# Patient Record
Sex: FEMALE | Race: WHITE | Employment: FULL TIME | URBAN - METROPOLITAN AREA
[De-identification: names, ages, dates, MRNs, and addresses within clinical notes are randomized per-mention and may not be internally consistent; named-entity substitution may affect disease eponyms.]

---

## 2017-03-10 ENCOUNTER — OFFICE VISIT (OUTPATIENT)
Dept: FAMILY MEDICINE CLINIC | Age: 42
End: 2017-03-10

## 2017-03-10 VITALS
TEMPERATURE: 97.8 F | HEART RATE: 75 BPM | WEIGHT: 219 LBS | BODY MASS INDEX: 29.66 KG/M2 | HEIGHT: 72 IN | SYSTOLIC BLOOD PRESSURE: 116 MMHG | DIASTOLIC BLOOD PRESSURE: 72 MMHG | OXYGEN SATURATION: 98 %

## 2017-03-10 DIAGNOSIS — L65.9 HAIR LOSS: Primary | ICD-10-CM

## 2017-03-10 DIAGNOSIS — F51.01 PRIMARY INSOMNIA: ICD-10-CM

## 2017-03-10 DIAGNOSIS — Z00.00 PREVENTATIVE HEALTH CARE: ICD-10-CM

## 2017-03-10 PROCEDURE — 99214 OFFICE O/P EST MOD 30 MIN: CPT | Performed by: FAMILY MEDICINE

## 2017-03-10 RX ORDER — TRAZODONE HYDROCHLORIDE 50 MG/1
50 TABLET ORAL NIGHTLY
Qty: 30 TABLET | Refills: 5 | Status: SHIPPED | OUTPATIENT
Start: 2017-03-10 | End: 2017-08-29 | Stop reason: SDUPTHER

## 2017-03-10 ASSESSMENT — ENCOUNTER SYMPTOMS: CONSTIPATION: 0

## 2017-03-13 ENCOUNTER — NURSE ONLY (OUTPATIENT)
Dept: FAMILY MEDICINE CLINIC | Age: 42
End: 2017-03-13

## 2017-03-13 DIAGNOSIS — Z00.00 PREVENTATIVE HEALTH CARE: ICD-10-CM

## 2017-03-13 DIAGNOSIS — Z00.00 PREVENTATIVE HEALTH CARE: Primary | ICD-10-CM

## 2017-03-13 DIAGNOSIS — L65.9 HAIR LOSS: ICD-10-CM

## 2017-03-13 PROCEDURE — 36415 COLL VENOUS BLD VENIPUNCTURE: CPT | Performed by: FAMILY MEDICINE

## 2017-03-14 DIAGNOSIS — R89.9 ABNORMAL LABORATORY TEST: Primary | ICD-10-CM

## 2017-03-14 LAB
ANION GAP SERPL CALCULATED.3IONS-SCNC: 16 MMOL/L (ref 3–16)
BUN BLDV-MCNC: 16 MG/DL (ref 7–20)
CALCIUM SERPL-MCNC: 9.2 MG/DL (ref 8.3–10.6)
CHLORIDE BLD-SCNC: 99 MMOL/L (ref 99–110)
CHOLESTEROL, TOTAL: 164 MG/DL (ref 0–199)
CO2: 25 MMOL/L (ref 21–32)
CREAT SERPL-MCNC: 0.9 MG/DL (ref 0.6–1.1)
GFR AFRICAN AMERICAN: >60
GFR NON-AFRICAN AMERICAN: >60
GLUCOSE BLD-MCNC: 86 MG/DL (ref 70–99)
HDLC SERPL-MCNC: 55 MG/DL (ref 40–60)
LDL CHOLESTEROL CALCULATED: 88 MG/DL
POTASSIUM SERPL-SCNC: 3.9 MMOL/L (ref 3.5–5.1)
SODIUM BLD-SCNC: 140 MMOL/L (ref 136–145)
TRIGL SERPL-MCNC: 103 MG/DL (ref 0–150)
TSH SERPL DL<=0.05 MIU/L-ACNC: 5.73 UIU/ML (ref 0.27–4.2)
VLDLC SERPL CALC-MCNC: 21 MG/DL

## 2017-03-17 ENCOUNTER — TELEPHONE (OUTPATIENT)
Dept: FAMILY MEDICINE CLINIC | Age: 42
End: 2017-03-17

## 2017-03-17 DIAGNOSIS — N91.2 AMENORRHEA: Primary | ICD-10-CM

## 2017-04-05 ENCOUNTER — NURSE ONLY (OUTPATIENT)
Dept: FAMILY MEDICINE CLINIC | Age: 42
End: 2017-04-05

## 2017-04-05 DIAGNOSIS — R89.9 ABNORMAL LABORATORY TEST: ICD-10-CM

## 2017-04-05 DIAGNOSIS — N91.2 AMENORRHEA: Primary | ICD-10-CM

## 2017-04-05 DIAGNOSIS — N91.2 AMENORRHEA: ICD-10-CM

## 2017-04-05 LAB
FOLLICLE STIMULATING HORMONE: 7.6 MIU/ML
LUTEINIZING HORMONE: 8.5 MIU/ML
T4 TOTAL: 5.9 UG/DL (ref 4.5–10.9)
TSH SERPL DL<=0.05 MIU/L-ACNC: 6.37 UIU/ML (ref 0.27–4.2)

## 2017-04-05 PROCEDURE — 36415 COLL VENOUS BLD VENIPUNCTURE: CPT | Performed by: FAMILY MEDICINE

## 2017-04-06 DIAGNOSIS — R89.9 ABNORMAL LABORATORY TEST: Primary | ICD-10-CM

## 2017-04-06 LAB — T4 FREE: 1 NG/DL (ref 0.9–1.8)

## 2017-05-05 ENCOUNTER — OFFICE VISIT (OUTPATIENT)
Dept: FAMILY MEDICINE CLINIC | Age: 42
End: 2017-05-05

## 2017-05-05 VITALS
BODY MASS INDEX: 29.8 KG/M2 | WEIGHT: 220 LBS | SYSTOLIC BLOOD PRESSURE: 102 MMHG | HEART RATE: 93 BPM | TEMPERATURE: 98.7 F | HEIGHT: 72 IN | DIASTOLIC BLOOD PRESSURE: 60 MMHG | OXYGEN SATURATION: 98 %

## 2017-05-05 DIAGNOSIS — K58.0 IRRITABLE BOWEL SYNDROME WITH DIARRHEA: ICD-10-CM

## 2017-05-05 DIAGNOSIS — Z87.898 H/O MOTION SICKNESS: ICD-10-CM

## 2017-05-05 DIAGNOSIS — F41.9 ANXIETY: Primary | ICD-10-CM

## 2017-05-05 PROCEDURE — 99213 OFFICE O/P EST LOW 20 MIN: CPT | Performed by: FAMILY MEDICINE

## 2017-05-05 RX ORDER — DICYCLOMINE HYDROCHLORIDE 10 MG/1
10 CAPSULE ORAL 4 TIMES DAILY PRN
Qty: 120 CAPSULE | Refills: 3 | Status: SHIPPED | OUTPATIENT
Start: 2017-05-05 | End: 2019-05-23

## 2017-05-05 RX ORDER — LORAZEPAM 0.5 MG/1
0.5 TABLET ORAL EVERY 8 HOURS PRN
Qty: 20 TABLET | Refills: 0 | Status: SHIPPED | OUTPATIENT
Start: 2017-05-05 | End: 2017-09-18 | Stop reason: SDUPTHER

## 2017-05-05 ASSESSMENT — PATIENT HEALTH QUESTIONNAIRE - PHQ9
2. FEELING DOWN, DEPRESSED OR HOPELESS: 0
SUM OF ALL RESPONSES TO PHQ9 QUESTIONS 1 & 2: 0
1. LITTLE INTEREST OR PLEASURE IN DOING THINGS: 0
SUM OF ALL RESPONSES TO PHQ QUESTIONS 1-9: 0

## 2017-05-05 ASSESSMENT — ENCOUNTER SYMPTOMS: DIARRHEA: 0

## 2017-08-29 DIAGNOSIS — F51.01 PRIMARY INSOMNIA: ICD-10-CM

## 2017-08-29 RX ORDER — TRAZODONE HYDROCHLORIDE 50 MG/1
TABLET ORAL
Qty: 30 TABLET | Refills: 2 | Status: SHIPPED | OUTPATIENT
Start: 2017-08-29 | End: 2017-09-28 | Stop reason: SDUPTHER

## 2017-09-18 DIAGNOSIS — F41.9 ANXIETY: ICD-10-CM

## 2017-09-18 RX ORDER — LORAZEPAM 0.5 MG/1
0.5 TABLET ORAL DAILY PRN
Qty: 15 TABLET | Refills: 0 | OUTPATIENT
Start: 2017-09-18 | End: 2018-02-26 | Stop reason: SDUPTHER

## 2017-09-28 ENCOUNTER — TELEPHONE (OUTPATIENT)
Dept: FAMILY MEDICINE CLINIC | Age: 42
End: 2017-09-28

## 2017-09-28 DIAGNOSIS — F51.01 PRIMARY INSOMNIA: ICD-10-CM

## 2017-09-28 RX ORDER — TRAZODONE HYDROCHLORIDE 50 MG/1
TABLET ORAL
Qty: 90 TABLET | Refills: 1 | Status: SHIPPED | OUTPATIENT
Start: 2017-09-28 | End: 2019-05-30 | Stop reason: ALTCHOICE

## 2017-09-28 NOTE — TELEPHONE ENCOUNTER
Spoke with pt regarding overdue blood work results.  Pt states that she can come in tomorrow at 11:45 am.

## 2017-09-29 ENCOUNTER — NURSE ONLY (OUTPATIENT)
Dept: FAMILY MEDICINE CLINIC | Age: 42
End: 2017-09-29

## 2017-09-29 DIAGNOSIS — R89.9 ABNORMAL LABORATORY TEST: Primary | ICD-10-CM

## 2017-09-29 DIAGNOSIS — R89.9 ABNORMAL LABORATORY TEST: ICD-10-CM

## 2017-09-29 PROCEDURE — 36415 COLL VENOUS BLD VENIPUNCTURE: CPT | Performed by: FAMILY MEDICINE

## 2017-09-30 LAB
T4 FREE: 0.9 NG/DL (ref 0.9–1.8)
TSH SERPL DL<=0.05 MIU/L-ACNC: 3.88 UIU/ML (ref 0.27–4.2)

## 2018-02-26 DIAGNOSIS — F41.9 ANXIETY: ICD-10-CM

## 2018-02-27 RX ORDER — LORAZEPAM 0.5 MG/1
0.5 TABLET ORAL DAILY PRN
Qty: 15 TABLET | Refills: 0 | Status: SHIPPED | OUTPATIENT
Start: 2018-02-27 | End: 2018-03-29

## 2018-03-01 ENCOUNTER — NURSE ONLY (OUTPATIENT)
Dept: FAMILY MEDICINE CLINIC | Age: 43
End: 2018-03-01

## 2018-03-01 DIAGNOSIS — Z79.899 MEDICATION MANAGEMENT: Primary | ICD-10-CM

## 2019-05-23 ENCOUNTER — HOSPITAL ENCOUNTER (EMERGENCY)
Age: 44
Discharge: HOME | End: 2019-05-23
Attending: EMERGENCY MEDICINE
Payer: COMMERCIAL

## 2019-05-23 VITALS
HEART RATE: 99 BPM | TEMPERATURE: 98.2 F | BODY MASS INDEX: 31.15 KG/M2 | OXYGEN SATURATION: 98 % | RESPIRATION RATE: 18 BRPM | DIASTOLIC BLOOD PRESSURE: 69 MMHG | WEIGHT: 230 LBS | HEIGHT: 72 IN | SYSTOLIC BLOOD PRESSURE: 114 MMHG

## 2019-05-23 DIAGNOSIS — D72.829 LEUKOCYTOSIS, UNSPECIFIED TYPE: ICD-10-CM

## 2019-05-23 DIAGNOSIS — R10.9 ABDOMINAL CRAMPING: ICD-10-CM

## 2019-05-23 DIAGNOSIS — R19.7 DIARRHEA, UNSPECIFIED TYPE: Primary | ICD-10-CM

## 2019-05-23 DIAGNOSIS — E86.0 DEHYDRATION: ICD-10-CM

## 2019-05-23 LAB
A/G RATIO: 1.3 (ref 1.1–2.2)
ALBUMIN SERPL-MCNC: 4.3 G/DL (ref 3.4–5)
ALP BLD-CCNC: 150 U/L (ref 40–129)
ALT SERPL-CCNC: 10 U/L (ref 10–40)
ANION GAP SERPL CALCULATED.3IONS-SCNC: 14 MMOL/L (ref 3–16)
AST SERPL-CCNC: 14 U/L (ref 15–37)
BACTERIA: ABNORMAL /HPF
BASOPHILS ABSOLUTE: 0.1 K/UL (ref 0–0.2)
BASOPHILS RELATIVE PERCENT: 0.5 %
BILIRUB SERPL-MCNC: <0.2 MG/DL (ref 0–1)
BILIRUBIN URINE: ABNORMAL
BLOOD, URINE: ABNORMAL
BUN BLDV-MCNC: 13 MG/DL (ref 7–20)
CALCIUM SERPL-MCNC: 9.7 MG/DL (ref 8.3–10.6)
CHLORIDE BLD-SCNC: 101 MMOL/L (ref 99–110)
CLARITY: CLEAR
CO2: 23 MMOL/L (ref 21–32)
COLOR: YELLOW
CREAT SERPL-MCNC: 0.9 MG/DL (ref 0.6–1.1)
EOSINOPHILS ABSOLUTE: 1.5 K/UL (ref 0–0.6)
EOSINOPHILS RELATIVE PERCENT: 12.9 %
EPITHELIAL CELLS, UA: ABNORMAL /HPF
GFR AFRICAN AMERICAN: >60
GFR NON-AFRICAN AMERICAN: >60
GLOBULIN: 3.3 G/DL
GLUCOSE BLD-MCNC: 115 MG/DL (ref 70–99)
GLUCOSE URINE: NEGATIVE MG/DL
HCG QUALITATIVE: NEGATIVE
HCT VFR BLD CALC: 43.9 % (ref 36–48)
HEMOGLOBIN: 15 G/DL (ref 12–16)
KETONES, URINE: >=80 MG/DL
LEUKOCYTE ESTERASE, URINE: NEGATIVE
LIPASE: 20 U/L (ref 13–60)
LYMPHOCYTES ABSOLUTE: 1.6 K/UL (ref 1–5.1)
LYMPHOCYTES RELATIVE PERCENT: 13.2 %
MCH RBC QN AUTO: 31.1 PG (ref 26–34)
MCHC RBC AUTO-ENTMCNC: 34.1 G/DL (ref 31–36)
MCV RBC AUTO: 91.2 FL (ref 80–100)
MICROSCOPIC EXAMINATION: YES
MONOCYTES ABSOLUTE: 0.7 K/UL (ref 0–1.3)
MONOCYTES RELATIVE PERCENT: 5.9 %
NEUTROPHILS ABSOLUTE: 8.1 K/UL (ref 1.7–7.7)
NEUTROPHILS RELATIVE PERCENT: 67.5 %
NITRITE, URINE: NEGATIVE
PDW BLD-RTO: 13.2 % (ref 12.4–15.4)
PH UA: 6 (ref 5–8)
PLATELET # BLD: 295 K/UL (ref 135–450)
PMV BLD AUTO: 7 FL (ref 5–10.5)
POTASSIUM SERPL-SCNC: 3.8 MMOL/L (ref 3.5–5.1)
PROTEIN UA: NEGATIVE MG/DL
RBC # BLD: 4.82 M/UL (ref 4–5.2)
RBC UA: ABNORMAL /HPF (ref 0–2)
SODIUM BLD-SCNC: 138 MMOL/L (ref 136–145)
SPECIFIC GRAVITY UA: 1.01 (ref 1–1.03)
TOTAL PROTEIN: 7.6 G/DL (ref 6.4–8.2)
URINE TYPE: ABNORMAL
UROBILINOGEN, URINE: 0.2 E.U./DL
WBC # BLD: 12 K/UL (ref 4–11)
WBC UA: ABNORMAL /HPF (ref 0–5)
YEAST: PRESENT /HPF

## 2019-05-23 PROCEDURE — 80053 COMPREHEN METABOLIC PANEL: CPT

## 2019-05-23 PROCEDURE — 2580000003 HC RX 258: Performed by: EMERGENCY MEDICINE

## 2019-05-23 PROCEDURE — 96361 HYDRATE IV INFUSION ADD-ON: CPT

## 2019-05-23 PROCEDURE — 81001 URINALYSIS AUTO W/SCOPE: CPT

## 2019-05-23 PROCEDURE — 99284 EMERGENCY DEPT VISIT MOD MDM: CPT

## 2019-05-23 PROCEDURE — 6360000002 HC RX W HCPCS: Performed by: EMERGENCY MEDICINE

## 2019-05-23 PROCEDURE — 84703 CHORIONIC GONADOTROPIN ASSAY: CPT

## 2019-05-23 PROCEDURE — 96374 THER/PROPH/DIAG INJ IV PUSH: CPT

## 2019-05-23 PROCEDURE — 85025 COMPLETE CBC W/AUTO DIFF WBC: CPT

## 2019-05-23 PROCEDURE — 83690 ASSAY OF LIPASE: CPT

## 2019-05-23 RX ORDER — ONDANSETRON 2 MG/ML
4 INJECTION INTRAMUSCULAR; INTRAVENOUS ONCE
Status: COMPLETED | OUTPATIENT
Start: 2019-05-23 | End: 2019-05-23

## 2019-05-23 RX ORDER — 0.9 % SODIUM CHLORIDE 0.9 %
1000 INTRAVENOUS SOLUTION INTRAVENOUS ONCE
Status: COMPLETED | OUTPATIENT
Start: 2019-05-23 | End: 2019-05-23

## 2019-05-23 RX ORDER — ONDANSETRON 4 MG/1
4 TABLET, ORALLY DISINTEGRATING ORAL EVERY 8 HOURS PRN
Qty: 20 TABLET | Refills: 0 | Status: SHIPPED | OUTPATIENT
Start: 2019-05-23 | End: 2021-05-05

## 2019-05-23 RX ORDER — DICYCLOMINE HYDROCHLORIDE 10 MG/1
10 CAPSULE ORAL EVERY 6 HOURS PRN
Qty: 20 CAPSULE | Refills: 0 | Status: SHIPPED | OUTPATIENT
Start: 2019-05-23 | End: 2021-05-05

## 2019-05-23 RX ADMIN — ONDANSETRON 4 MG: 2 INJECTION INTRAMUSCULAR; INTRAVENOUS at 18:25

## 2019-05-23 RX ADMIN — SODIUM CHLORIDE 1000 ML: 9 INJECTION, SOLUTION INTRAVENOUS at 18:25

## 2019-05-23 ASSESSMENT — ENCOUNTER SYMPTOMS
SHORTNESS OF BREATH: 0
COLOR CHANGE: 0
ABDOMINAL DISTENTION: 0
ANAL BLEEDING: 0
RECTAL PAIN: 1
ABDOMINAL PAIN: 1
VOMITING: 0
BLOOD IN STOOL: 0
NAUSEA: 0
CHEST TIGHTNESS: 0
DIARRHEA: 1
CONSTIPATION: 0
BACK PAIN: 0

## 2019-05-23 NOTE — ED NOTES
Per lab, specimen cup containing urine came open during transfer to lab and sample was rejected. Will inform MD and re-collect sample.      Loretha Riedel, RN  05/23/19 0827

## 2019-05-23 NOTE — ED PROVIDER NOTES
[address] [facility]  ED  EMERGENCY DEPARTMENT ENCOUNTER        Pt Name: Victoriano Miguel  MRN: [id number]  [place] 1975  Date of evaluation: 5/23/2019  Provider: Clotilde Estevez MD  PCP: No primary care provider on file. CHIEF COMPLAINT       Chief Complaint   Patient presents with    Diarrhea     diarrhea started last weekend. states it got better but says since Tuesday she's had continued diarrhea. pt able to intake fluids and food ok, however c./o continued diarrhea        HISTORY OFPRESENT ILLNESS   (Location/Symptom, Timing/Onset, Context/Setting, Quality, Duration, Modifying Factors,Severity)  Note limiting factors. Victoriano Miguel is a 37 y.o. female presenting today due to concern for having diarrhea for the last 5 days but initially started to improve until 3 days ago whenever it started becoming more frequent again. However, today during one of the diarrhea episode she felt lightheaded and had sweating associated with it. She denies any headache, chest pain, shortness of breath, fever. She has not been on any antibiotics recently and no recent travel. She did have a prior cholecystectomy and has chronic diarrhea but states this is much worse. She is nauseated but no vomiting. No urinary or vaginal complaints. Due to concern for worsening diarrhea, she came to the emergency department for further evaluation. REVIEW OF SYSTEMS    (2-9 systems for level 4, 10 or more for level 5)     Review of Systems   Constitutional: Positive for diaphoresis (during diarrhea episode earlier today, not currently). Negative for chills, fatigue and fever. HENT: Negative for congestion. Respiratory: Negative for chest tightness and shortness of breath. Cardiovascular: Negative for chest pain. Gastrointestinal: Positive for abdominal pain (not currently, but has discomfort intermittently), diarrhea and rectal pain (only with diarrhea, none currently).  Negative for abdominal Used   Substance and Sexual Activity    Alcohol use: No    Drug use: No    Sexual activity: Yes     Partners: Male     Comment: hai = Radha Topete   Lifestyle    Physical activity:     Days per week: None     Minutes per session: None    Stress: None   Relationships    Social connections:     Talks on phone: None     Gets together: None     Attends [employer] service: None     Active member of club or organization: None     Attends meetings of clubs or organizations: None     Relationship status: None    Intimate partner violence:     Fear of current or ex partner: None     Emotionally abused: None     Physically abused: None     Forced sexual activity: None   Other Topics Concern    None   Social History Narrative    None       SCREENINGS    Colin Coma Scale  Eye Opening: Spontaneous  Best Verbal Response: Oriented  Best Motor Response: Obeys commands  Colin Coma Scale Score: 15           PHYSICAL EXAM    (up to 7 for level 4, 8 or more for level 5)     ED Triage Vitals [05/23/19 1727]   BP Temp Temp Source Pulse Resp SpO2 Height Weight   (!) 143/80 98.2 °F (36.8 °C) Oral 113 16 95 % 6' (1.829 m) 230 lb (104.3 kg)       Physical Exam   Constitutional: She is oriented to person, place, and time. She appears well-developed and well-nourished. She is active and cooperative. Non-toxic appearance. She does not have a sickly appearance. She does not appear ill. No distress. HENT:   Head: Normocephalic and atraumatic. Nose: Nose normal.   Mouth/Throat: Uvula is midline. Mucous membranes are dry. No trismus in the jaw. No oropharyngeal exudate. Eyes: Pupils are equal, round, and reactive to light. EOM are normal. Right eye exhibits no discharge. Left eye exhibits no discharge. Neck: Normal range of motion and full passive range of motion without pain. Neck supple. No neck rigidity. No tracheal deviation, no edema, no erythema and normal range of motion present.    Cardiovascular: Regular rhythm and intact distal pulses. Tachycardia present. Pulmonary/Chest: Effort normal and breath sounds normal. No accessory muscle usage or stridor. No respiratory distress. She has no decreased breath sounds. She has no wheezes. She has no rhonchi. She has no rales. She exhibits no tenderness. Abdominal: Soft. Bowel sounds are normal. She exhibits no distension. There is no tenderness. There is no rigidity, no rebound, no guarding, no tenderness at McBurney's point and negative Cheatham's sign. Very benign abdominal exam with no tenderness     Musculoskeletal: Normal range of motion. She exhibits no edema, tenderness or deformity. Neurological: She is alert and oriented to person, place, and time. She has normal strength. She displays no atrophy and no tremor. No sensory deficit. She exhibits normal muscle tone. She displays no seizure activity. GCS eye subscore is 4. GCS verbal subscore is 5. GCS motor subscore is 6. Skin: Skin is warm, dry and intact. No rash noted. She is not diaphoretic. No erythema. No pallor. Psychiatric: She has a normal mood and affect. Nursing note and vitals reviewed.           DIAGNOSTIC RESULTS   :    Labs Reviewed   CBC WITH AUTO DIFFERENTIAL - Abnormal; Notable for the following components:       Result Value    WBC 12.0 (*)     Neutrophils # 8.1 (*)     Eosinophils # 1.5 (*)     All other components within normal limits    Narrative:     Performed at:  [address],  [place], [address]   Phone [phone]   COMPREHENSIVE METABOLIC PANEL - Abnormal; Notable for the following components:    Glucose 115 (*)     Alkaline Phosphatase 150 (*)     AST 14 (*)     All other components within normal limits    Narrative:     Performed at:  [facility]) - [facility]  [address],  [place], [address]   Phone [phone]   URINALYSIS - Abnormal; Notable for the following components:    Bilirubin Urine SMALL (*) Ketones, Urine >=80 (*)     Blood, Urine TRACE-INTACT (*)     All other components within normal limits    Narrative:     CALL  [facility]  [facility] tel. [phone],  Urinalysis results called to and read back by Eulalio RYAN, 05/23/2019  19:41, by Loc Spencer  Performed at:  [address], [facility] [place]   Phone [phone]   MICROSCOPIC URINALYSIS - Abnormal; Notable for the following components:    Bacteria, UA Rare (*)     Yeast, UA Present (*)     All other components within normal limits    Narrative:     CALL  [facility]  [facility] tel. [phone],  Urinalysis results called to and read back by Eulalio RYAN, 05/23/2019  19:41, by Loc Spencer  Performed at:  [address], [facility] [place]   Phone [phone]   LIPASE    Narrative:     Performed at:  [facility]) [address], [facility] [place]   Phone [phone]   HCG, SERUM, QUALITATIVE    Narrative:     Performed at:  [address], [facility] [place]   Phone [phone]       All other labs were within normal range or not returned asof this dictation. EKG: All EKG's are interpreted by the Emergency Department Physician who either signs or Co-signs this chart in the absence of a cardiologist.        RADIOLOGY:   Non-plain film images such as CT, Ultrasound and MRI are read by the radiologist. Romi Sauers images are visualized and preliminarily interpreted by the  ED Provider with the belowfindings:        Interpretation per the Radiologist below, if available at the time of this note:    No orders to display         PROCEDURES   Unless otherwise noted below, none     Procedures    CRITICAL CARE TIME   Time: 20 minutes   Includes repeat examinations,lab  interpretation, charting, IV fluids for tachycardia/dehydration    Excludes separate billable procedures.   Patient at for discharge without needing further IV fluids and based on subjective testing, she would be negative for orthostatics. She denied ever having any chest pain or shortness of breath and story not suggestive of acute coronary syndrome or pulmonary embolism. She understands to return to the emergency department if she were to develop either chest pain or shortness of breath. Otherwise, she was told to follow up with her primary doctor in 2-3 days, but return sooner to the emergency department if symptoms worsen,. She was well-appearing and in no acute distress at time of discharge and felt comfortable with this plan. No urinary complaints and therefore she was not treated for yeast at this time. The patient tolerated their visit well. The patient and / or the family were informed of the results of any tests, a time was given to answer questions. FINAL IMPRESSION      1. Diarrhea, unspecified type    2. Dehydration    3. Abdominal cramping    4.  Leukocytosis, unspecified type          DISPOSITION/PLAN   DISPOSITION  - discharged in improved, stable condition       PATIENT REFERRED TO:  [facility]  ED  [address] [facility] [address]  Go to   If symptoms worsen    [facility]  [phone]  Schedule an appointment as soon as possible for a visit in 3 days        DISCHARGEMEDICATIONS:  Discharge Medication List as of 5/23/2019  8:53 PM      START taking these medications    Details   ondansetron (ZOFRAN ODT) 4 MG disintegrating tablet Take 1 tablet by mouth every 8 hours as needed for Nausea, Disp-20 tablet, R-0Print             DISCONTINUED MEDICATIONS:  Discharge Medication List as of 5/23/2019  8:53 PM      STOP taking these medications       triamterene-hydrochlorothiazide (DYAZIDE) 37.5-25 MG per capsule Comments:   Reason for Stopping:         levonorgestrel (MIRENA) 20 MCG/24HR IUD Comments:   Reason for Stopping:                      (Please note

## 2019-05-24 ENCOUNTER — HOSPITAL ENCOUNTER (OUTPATIENT)
Age: 44
Setting detail: SPECIMEN
Discharge: HOME | End: 2019-05-24
Payer: COMMERCIAL

## 2019-05-24 LAB — C DIFFICILE TOXIN, EIA: NORMAL

## 2019-05-24 PROCEDURE — 87336 ENTAMOEB HIST DISPR AG IA: CPT

## 2019-05-24 PROCEDURE — 87324 CLOSTRIDIUM AG IA: CPT

## 2019-05-24 PROCEDURE — 87046 STOOL CULTR AEROBIC BACT EA: CPT

## 2019-05-24 PROCEDURE — 87328 CRYPTOSPORIDIUM AG IA: CPT

## 2019-05-24 PROCEDURE — 87449 NOS EACH ORGANISM AG IA: CPT

## 2019-05-24 PROCEDURE — 87505 NFCT AGENT DETECTION GI: CPT

## 2019-05-25 LAB
CRYPTOSPORIDIUM ANTIGEN STOOL: NORMAL
E HISTOLYTICA ANTIGEN STOOL: NORMAL
GI BACTERIAL PATHOGENS BY PCR: NORMAL
GIARDIA ANTIGEN STOOL: NORMAL

## 2019-05-30 ENCOUNTER — OFFICE VISIT (OUTPATIENT)
Dept: FAMILY MEDICINE CLINIC | Age: 44
End: 2019-05-30
Payer: COMMERCIAL

## 2019-05-30 VITALS
HEART RATE: 89 BPM | HEIGHT: 72 IN | DIASTOLIC BLOOD PRESSURE: 74 MMHG | BODY MASS INDEX: 30.61 KG/M2 | WEIGHT: 226 LBS | SYSTOLIC BLOOD PRESSURE: 130 MMHG | OXYGEN SATURATION: 99 %

## 2019-05-30 DIAGNOSIS — R73.9 BLOOD GLUCOSE ELEVATED: ICD-10-CM

## 2019-05-30 DIAGNOSIS — Z90.49 STATUS POST CHOLECYSTECTOMY: ICD-10-CM

## 2019-05-30 DIAGNOSIS — K58.0 IRRITABLE BOWEL SYNDROME WITH DIARRHEA: Primary | ICD-10-CM

## 2019-05-30 DIAGNOSIS — R31.1 BENIGN ESSENTIAL MICROSCOPIC HEMATURIA: ICD-10-CM

## 2019-05-30 DIAGNOSIS — E66.09 CLASS 1 OBESITY DUE TO EXCESS CALORIES WITHOUT SERIOUS COMORBIDITY WITH BODY MASS INDEX (BMI) OF 30.0 TO 30.9 IN ADULT: ICD-10-CM

## 2019-05-30 PROCEDURE — 99214 OFFICE O/P EST MOD 30 MIN: CPT | Performed by: FAMILY MEDICINE

## 2019-05-30 RX ORDER — CHOLESTYRAMINE 4 G/9G
1 POWDER, FOR SUSPENSION ORAL DAILY
Qty: 90 PACKET | Refills: 3 | Status: SHIPPED | OUTPATIENT
Start: 2019-05-30 | End: 2021-05-05

## 2019-05-30 ASSESSMENT — PATIENT HEALTH QUESTIONNAIRE - PHQ9
SUM OF ALL RESPONSES TO PHQ QUESTIONS 1-9: 0
1. LITTLE INTEREST OR PLEASURE IN DOING THINGS: 0
SUM OF ALL RESPONSES TO PHQ9 QUESTIONS 1 & 2: 0
SUM OF ALL RESPONSES TO PHQ QUESTIONS 1-9: 0
2. FEELING DOWN, DEPRESSED OR HOPELESS: 0

## 2019-05-30 NOTE — PROGRESS NOTES
2019     Rosa Oswald (:  1975)is a 37 y.o. female, here for evaluation of the following medical concerns:    CC: diarrhea     HPI     diarrhea   Seen in ED about a week ago, had normal stool studies  Has been bad for 2 weeks, has prior diagnosis of IBS, has had gallbladder removed    No family hx of ulcerative colitis  Not on any medications  Not recently on any abx    Gall bladder has been taken out, had IBSD after this    Anxiety  Controlled, taking zoloft as prescribed    Obesity  BMI of 30, on low carb diet    hematuria  Had trace blood in UA in ED    Elevated blood glucose  Also had BS of 115    Review of Systems  GI: + diarrhea, somewhat chronic, worse now  No blood in stool  No nighttime pain  Psych: no anxiety    Prior to Visit Medications    Medication Sig Taking? Authorizing Provider   Loperamide HCl (IMODIUM PO) Take by mouth Yes Historical Provider, MD   cholestyramine (QUESTRAN) 4 g packet Take 1 packet by mouth daily Yes Ирина Page MD   dicyclomine (BENTYL) 10 MG capsule Take 1 capsule by mouth every 6 hours as needed (cramps) Yes Rosales Jimenez MD   sertraline (ZOLOFT) 100 MG tablet Take 50 mg by mouth daily  Yes Historical Provider, MD   ondansetron (ZOFRAN ODT) 4 MG disintegrating tablet Take 1 tablet by mouth every 8 hours as needed for Nausea  Rosales Jimenez MD        Social History     Tobacco Use    Smoking status: Never Smoker    Smokeless tobacco: Never Used   Substance Use Topics    Alcohol use: No        Vitals:    19 1416   BP: 130/74   Site: Right Upper Arm   Position: Sitting   Cuff Size: Large Adult   Pulse: 89   SpO2: 99%  Comment: RA   Weight: 226 lb (102.5 kg)   Height: 6' (1.829 m)     Estimated body mass index is 30.65 kg/m² as calculated from the following:    Height as of this encounter: 6' (1.829 m). Weight as of this encounter: 226 lb (102.5 kg). Physical Exam  Constitutional: appears well-developed and well-nourished. No distress.

## 2019-05-30 NOTE — PATIENT INSTRUCTIONS
Patient Education        Diet for Irritable Bowel Syndrome: Care Instructions  Your Care Instructions    Irritable bowel syndrome, or IBS, is a problem with the intestines. IBS can cause belly pain, bloating, gas, constipation, and diarrhea. Most people can control their symptoms by changing their diet and easing stress. No specific foods cause everyone with IBS to have symptoms. Many people find that they feel better by limiting or eliminating foods that may bring on symptoms. Make sure you don't stop eating all foods from any one food group without talking with a dietitian. You need to make sure you are still getting all the nutrients you need. Follow-up care is a key part of your treatment and safety. Be sure to make and go to all appointments, and call your doctor if you are having problems. It's also a good idea to know your test results and keep a list of the medicines you take. How can you care for yourself at home? To reduce constipation  · Include fruits, vegetables, beans, and whole grains in your diet each day. These foods are high in fiber. Slowly increase the amount of fiber you eat. This helps you avoid a lot of gas. · Drink plenty of fluids. If you have kidney, heart, or liver disease and have to limit fluids, talk with your doctor before you increase the amount of fluids you drink. · Get some exercise every day. Build up slowly to 30 to 60 minutes a day on 5 or more days of the week. · Take a fiber supplement, such as Citrucel or Metamucil, every day if needed. Read and follow all instructions on the label. · Schedule time each day for a bowel movement. Having a daily routine may help. Take your time and do not strain when having a bowel movement. · Check with your doctor before you increase the amount of fiber in your diet. For some people who have IBS, eating more fiber may make some symptoms worse. This includes bloating.   To reduce diarrhea  You may try giving up foods or drinks one at a time to see whether symptoms improve. Limit or avoid the following:  · Alcohol  · Caffeine, which is found in coffee, tea, cola drinks, and chocolate  · Nicotine, from smoking or chewing tobacco  · Gas-producing foods, such as beans, broccoli, cabbage, and apples  · Dairy products that contain lactose (milk sugar), such as ice cream and milk. · Foods and drinks high in sugar, especially fruit juice, soda, candy, and other packaged sweets (such as cookies)  · Foods high in fat, including phelps, sausage, butter, oils, and anything deep-fried  · Sorbitol and xylitol, artificial sweeteners found in some sugarless candies and chewing gum  Keep track of foods  · Some people with IBS use a daily food diary to keep track of what they eat and whether they have any symptoms after eating certain foods. The diary also can be a good way to record what is going on in your life. · Stress plays a role in IBS. So if you are aware that certain stresses bring on symptoms, you can try to reduce those stresses. Keep mealtimes pleasant  · Try to maintain a pleasant environment when you eat. This may reduce stress that can make symptoms likely to occur. · Give yourself plenty of time to eat, rather than eating on the go. Chew your food slowly. Try not to swallow air, which can cause bloating. Where can you learn more? Go to https://GAMINSIDE.Weby[employer].. org and sign in to your [employer] account. Enter P525 in the [facility] box to learn more about \"Diet for Irritable Bowel Syndrome: Care Instructions. \"     If you do not have an account, please click on the \"Sign Up Now\" link. Current as of: November 7, 2018  Content Version: 12.0  © 1434-7983 [employer]wise, [employer]. Care instructions adapted under license by [facility] ([facility]).  If you have questions about a medical condition or this instruction, always ask your healthcare professional. Wilma Lynn any warranty or liability for your use of

## 2019-05-31 LAB
C-REACTIVE PROTEIN: 2.3 MG/L (ref 0–5.1)
ESTIMATED AVERAGE GLUCOSE: 111.2 MG/DL
HBA1C MFR BLD: 5.5 %

## 2019-06-01 LAB — TISSUE TRANSGLUTAMINASE IGA: 3 U/ML (ref 0–3)

## 2020-03-12 LAB
ALBUMIN SERPL-MCNC: 4.4 G/DL
ALP BLD-CCNC: 109 U/L
ALT SERPL-CCNC: 14 U/L
ANION GAP SERPL CALCULATED.3IONS-SCNC: NORMAL MMOL/L
AST SERPL-CCNC: 18 U/L
BASOPHILS ABSOLUTE: 0 /ΜL
BASOPHILS RELATIVE PERCENT: 1 %
BILIRUB SERPL-MCNC: 0.3 MG/DL (ref 0.1–1.4)
BUN BLDV-MCNC: 23 MG/DL
CALCIUM SERPL-MCNC: 9.3 MG/DL
CHLORIDE BLD-SCNC: 106 MMOL/L
CHOLESTEROL, TOTAL: 147 MG/DL
CHOLESTEROL/HDL RATIO: NORMAL
CO2: 26 MMOL/L
CREAT SERPL-MCNC: 0.8 MG/DL
EOSINOPHILS ABSOLUTE: 0.1 /ΜL
EOSINOPHILS RELATIVE PERCENT: 2.4 %
GFR CALCULATED: 89
GLUCOSE BLD-MCNC: 99 MG/DL
HCT VFR BLD CALC: 42.5 % (ref 36–46)
HDLC SERPL-MCNC: 57 MG/DL (ref 35–70)
HEMOGLOBIN: 14.8 G/DL (ref 12–16)
LDL CHOLESTEROL CALCULATED: 82 MG/DL (ref 0–160)
LYMPHOCYTES ABSOLUTE: 1.4 /ΜL
LYMPHOCYTES RELATIVE PERCENT: 35.9 %
MCH RBC QN AUTO: 31.9 PG
MCHC RBC AUTO-ENTMCNC: 34.9 G/DL
MCV RBC AUTO: 91.4 FL
MONOCYTES ABSOLUTE: 0.4 /ΜL
MONOCYTES RELATIVE PERCENT: 9.7 %
NEUTROPHILS ABSOLUTE: 2 /ΜL
NEUTROPHILS RELATIVE PERCENT: 51 %
NONHDLC SERPL-MCNC: NORMAL MG/DL
PDW BLD-RTO: 13.6 %
PLATELET # BLD: 297 K/ΜL
PMV BLD AUTO: NORMAL FL
POTASSIUM SERPL-SCNC: 4.9 MMOL/L
RBC # BLD: 4.65 10^6/ΜL
SODIUM BLD-SCNC: 145 MMOL/L
TOTAL PROTEIN: 7.2
TRIGL SERPL-MCNC: 40 MG/DL
VLDLC SERPL CALC-MCNC: 8 MG/DL
WBC # BLD: 4 10^3/ML

## 2021-05-02 ENCOUNTER — HOSPITAL ENCOUNTER (EMERGENCY)
Age: 46
Discharge: HOME | End: 2021-05-02
Payer: COMMERCIAL

## 2021-05-02 ENCOUNTER — APPOINTMENT (OUTPATIENT)
Dept: GENERAL RADIOLOGY | Age: 46
End: 2021-05-02
Payer: COMMERCIAL

## 2021-05-02 VITALS
BODY MASS INDEX: 28.44 KG/M2 | SYSTOLIC BLOOD PRESSURE: 141 MMHG | WEIGHT: 210 LBS | RESPIRATION RATE: 16 BRPM | TEMPERATURE: 98 F | HEIGHT: 72 IN | OXYGEN SATURATION: 96 % | HEART RATE: 91 BPM | DIASTOLIC BLOOD PRESSURE: 78 MMHG

## 2021-05-02 DIAGNOSIS — S92.225A CLOSED NONDISPLACED FRACTURE OF LATERAL CUNEIFORM OF LEFT FOOT, INITIAL ENCOUNTER: ICD-10-CM

## 2021-05-02 DIAGNOSIS — S92.909A FOOT FRACTURE, UNSPECIFIED LATERALITY, CLOSED, INITIAL ENCOUNTER: Primary | ICD-10-CM

## 2021-05-02 DIAGNOSIS — T14.8XXA ABRASION: ICD-10-CM
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
## 2021-05-02 DIAGNOSIS — S92.902A CLOSED FRACTURE OF LEFT FOOT, INITIAL ENCOUNTER: ICD-10-CM

## 2021-05-02 PROCEDURE — 73610 X-RAY EXAM OF ANKLE: CPT

## 2021-05-02 PROCEDURE — 73630 X-RAY EXAM OF FOOT: CPT

## 2021-05-02 PROCEDURE — 6370000000 HC RX 637 (ALT 250 FOR IP): Performed by: NURSE PRACTITIONER

## 2021-05-02 PROCEDURE — 99284 EMERGENCY DEPT VISIT MOD MDM: CPT

## 2021-05-02 RX ORDER — NAPROXEN 500 MG/1
500 TABLET ORAL 2 TIMES DAILY
Qty: 20 TABLET | Refills: 0 | Status: SHIPPED | OUTPATIENT
Start: 2021-05-02 | End: 2022-05-02

## 2021-05-02 RX ORDER — OXYCODONE HYDROCHLORIDE AND ACETAMINOPHEN 5; 325 MG/1; MG/1
1 TABLET ORAL EVERY 6 HOURS PRN
Qty: 10 TABLET | Refills: 0 | Status: SHIPPED | OUTPATIENT
Start: 2021-05-02 | End: 2021-05-05

## 2021-05-02 RX ORDER — OXYCODONE HYDROCHLORIDE AND ACETAMINOPHEN 5; 325 MG/1; MG/1
1 TABLET ORAL ONCE
Status: COMPLETED | OUTPATIENT
Start: 2021-05-02 | End: 2021-05-02

## 2021-05-02 RX ADMIN — OXYCODONE AND ACETAMINOPHEN 1 TABLET: 5; 325 TABLET ORAL at 17:28

## 2021-05-02 ASSESSMENT — PAIN DESCRIPTION - ORIENTATION: ORIENTATION: LEFT

## 2021-05-02 ASSESSMENT — ENCOUNTER SYMPTOMS
ABDOMINAL PAIN: 0
SHORTNESS OF BREATH: 0
SORE THROAT: 0
COLOR CHANGE: 0
RHINORRHEA: 0

## 2021-05-02 NOTE — ED PROVIDER NOTES
Evaluated by [id number] [facility] Provider          [facility]  EMERGENCY DEPARTMENT ENCOUNTER        Pt Name: Poonam Pires  MRN: [id number]  [place] 1975  Dateof evaluation: 5/2/2021  Provider: EMIGDIO Dallas CNP  PCP: No primary care provider on file. ED Attending: No att. providers found    [facility]       Chief Complaint   Patient presents with    Ankle Pain     left ankle pain/bruising/swelling. slipped on stairs about an hour ago and injured left ankle, unable to put any pressure on foot       HISTORY OF PRESENTILLNESS   (Location/Symptom, Timing/Onset, Context/Setting, Quality, Duration, Modifying Factors, Severity)  Note limiting factors. Poonam Pires is a 48 y.o. female for left ankle pain and swelling. Onset was today. Context includes pt fell down the stairs today and now is having left ankle pain and swelling. pt denies hitting her head. Alleviating factors include nothing. Aggravating factors include movement and ambulation. Pain is 4/10. nothing has been used for pain today. Nursing Notes were all reviewed and agreed with or any disagreements were addressed  in the HPI. REVIEW OF SYSTEMS    (2-9 systems for level 4, 10 or more for level 5)     Review of Systems   Constitutional: Negative for fever. HENT: Negative for congestion, rhinorrhea and sore throat. Respiratory: Negative for shortness of breath. Cardiovascular: Negative for chest pain. Gastrointestinal: Negative for abdominal pain. Genitourinary: Negative for decreased urine volume and difficulty urinating. Musculoskeletal: Negative for arthralgias and myalgias. Left ankle pain and swelling     Skin: Negative for color change and rash. Neurological: Negative for dizziness and light-headedness. Psychiatric/Behavioral: Negative for agitation. All other systems reviewed and are negative. Positives and Pertinent negatives as per HPI.   Except as noted above in the ROS, all other systems were reviewed and negative. PAST MEDICAL HISTORY     Past Medical History:   Diagnosis Date    Anxiety     Gamekeeper thumb     Meniere disease     Spontaneous vaginal delivery         Varicella          SURGICAL HISTORY       Past Surgical History:   Procedure Laterality Date    BREAST ENHANCEMENT SURGERY      CHOLECYSTECTOMY  2006    Laparoscopy    EYE SURGERY      strabismus         CURRENT MEDICATIONS       Discharge Medication List as of 2021  5:33 PM      CONTINUE these medications which have NOT CHANGED    Details   Loperamide HCl (IMODIUM PO) Take by mouthHistorical Med      cholestyramine (QUESTRAN) 4 g packet Take 1 packet by mouth daily, Disp-90 packet, R-3Normal      ondansetron (ZOFRAN ODT) 4 MG disintegrating tablet Take 1 tablet by mouth every 8 hours as needed for Nausea, Disp-20 tablet, R-0Print      dicyclomine (BENTYL) 10 MG capsule Take 1 capsule by mouth every 6 hours as needed (cramps), Disp-20 capsule, R-0Print      sertraline (ZOLOFT) 100 MG tablet Take 50 mg by mouth daily Historical Med               ALLERGIES     Patient has no known allergies.     FAMILY HISTORY       Family History   Problem Relation Age of Onset    High Cholesterol Mother     Cancer Maternal Grandmother     Cancer Maternal Grandfather     Other Daughter         global apraxia, childhood apraxia of speech          SOCIAL HISTORY       Social History     Socioeconomic History    Marital status:      Spouse name: Not on file    Number of children: Not on file    Years of education: Not on file    Highest education level: Not on file   Occupational History    Not on file   Tobacco Use    Smoking status: Never Smoker    Smokeless tobacco: Never Used   Vaping Use    Vaping Use: Never used   Substance and Sexual Activity    Alcohol use: No    Drug use: No    Sexual activity: Yes     Partners: Male     Comment: husb = Susannah Lees   Other Topics Concern    Not on file   Social History Narrative    Not on file     Social Determinants of Health     Financial Resource Strain:     Difficulty of Paying Living Expenses:    Food Insecurity:     Worried About Running Out of Food in the Last Year:     [address] in the Last Year:    Transportation Needs:     Lack of Transportation (Medical):  Lack of Transportation (Non-Medical):    Physical Activity:     Days of Exercise per Week:     Minutes of Exercise per Session:    Stress:     Feeling of Stress :    Social Connections:     Frequency of Communication with Friends and Family:     Frequency of Social Gatherings with Friends and Family:     Attends [employer] Services:     Active Member of Clubs or Organizations:     Attends Club or Organization Meetings:     Marital Status:    Intimate Partner Violence:     Fear of Current or Ex-Partner:     Emotionally Abused:     Physically Abused:     Sexually Abused:        SCREENINGS             PHYSICAL EXAM  (up to 7 for level 4, 8 or more for level 5)     ED Triage Vitals   BP Temp Temp Source Pulse Resp SpO2 Height Weight   05/02/21 1546 05/02/21 1543 05/02/21 1543 05/02/21 1546 05/02/21 1546 05/02/21 1546 05/02/21 1543 05/02/21 1543   (!) 141/78 98 °F (36.7 °C) Oral 91 16 96 % 6' (1.829 m) 210 lb (95.3 kg)       Physical Exam  Constitutional:       Appearance: She is well-developed. HENT:      Head: Normocephalic and atraumatic. Neck:      Musculoskeletal: Normal range of motion. No spinous process tenderness or muscular tenderness. Cardiovascular:      Rate and Rhythm: Normal rate. Pulmonary:      Effort: Pulmonary effort is normal. No respiratory distress. Abdominal:      General: There is no distension. Palpations: Abdomen is soft. Tenderness: There is no abdominal tenderness. Musculoskeletal: Normal range of motion. Comments: Decreased range of motion due to pain elicited with movement to left ankle.   Sensation and pulses intact to left foot. Tenderness with palpation to left lateral malleolus. Second and third toes were also ecchymotic. There is edema noted to the lateral malleolus. Small amount of ecchymosis noted to the medial malleolus as well. Skin:     General: Skin is warm and dry. Neurological:      Mental Status: She is alert and oriented to person, place, and time. DIAGNOSTIC RESULTS   LABS:    Labs Reviewed - No data to display    All other labs werewithin normal range or not returned as of this dictation. EKG: All EKG's are interpreted by the Emergency Department Physician who either signs or Co-signs this chart in the absence of a cardiologist.  Please see their note for interpretation of EKG. RADIOLOGY:     Left ankle x-ray interpreted by radiologist for  Impression:    Left foot/ankle: Small displaced 4 mm fracture fragment lateral aspect of the   proximal to mid foot of uncertain bone etiology, favored to arise from the   calcaneus.  No evidence of fracture elsewhere and no dislocation.  Overlying   lateral soft tissue swelling. Left foot x-ray interpreted by radiologist for  Impression:    Left foot/ankle: Small displaced 4 mm fracture fragment lateral aspect of the   proximal to mid foot of uncertain bone etiology, favored to arise from the   calcaneus.  No evidence of fracture elsewhere and no dislocation.  Overlying   lateral soft tissue swelling. Interpretation per the Radiologist below, if available at the time of this note:    XR ANKLE LEFT (MIN 3 VIEWS)   Final Result   Left foot/ankle: Small displaced 4 mm fracture fragment lateral aspect of the   proximal to mid foot of uncertain bone etiology, favored to arise from the   calcaneus. No evidence of fracture elsewhere and no dislocation. Overlying   lateral soft tissue swelling.          XR FOOT LEFT (MIN 3 VIEWS)   Final Result   Left foot/ankle: Small displaced 4 mm fracture fragment lateral aspect of the   proximal to mid foot of uncertain bone etiology, favored to arise from the   calcaneus. No evidence of fracture elsewhere and no dislocation. Overlying   lateral soft tissue swelling. No results found. PROCEDURES   Unless otherwise noted below, none     Procedures     CRITICAL CARE TIME   N/A    CONSULTS:  None      EMERGENCYDEPARTMENT COURSE and DIFFERENTIAL DIAGNOSIS/MDM:   Vitals:    Vitals:    05/02/21 1543 05/02/21 1546   BP:  (!) 141/78   Pulse:  91   Resp:  16   Temp: 98 °F (36.7 °C)    TempSrc: Oral    SpO2:  96%   Weight: 210 lb (95.3 kg)    Height: 6' (1.829 m)        Patient was given the following medications:  Medications   oxyCODONE-acetaminophen (PERCOCET) 5-325 MG per tablet 1 tablet (1 tablet Oral Given 5/2/21 1728)       Patient was seen and evaluated by myself. Patient here for a mechanical fall. Patient states that she fell down some stairs twisting her left ankle. On exam she is awake and alert hemodynamically stable nontoxic in appearance. Patient denies hitting her head or have any loss of consciousness. She does have ecchymosis and edema noted to the left lateral malleolus. There is a small amount of ecchymosis to the medial malleolus. She is neurovascular intact her left foot. X-ray was concerning for a midfoot fracture. Patient was provided with Percocet in the ED. To be discharged home with Percocet and Naprosyn. She was given orthotic boot and orthopedic referral.  She was also given crutches and a PCP referral.  She was encouraged to follow-up with the orthopedist and her primary care doctor in the next few days. She was also encouraged to return to the ED for any worsening symptoms. Patient was ultimately discharged home with all questions answered. The patient tolerated their visit well. I have evaluated this patient. My supervising physician was available for consultation.  The patient and / or the family were informed of the results of any tests, a time was given to answer questions, a plan was proposed and they agreed with plan. FINAL IMPRESSION      1. Foot fracture, unspecified laterality, closed, initial encounter    2. Closed fracture of left foot, initial encounter    3. Abrasion    4. Fall down stairs, initial encounter    5. Closed nondisplaced fracture of lateral cuneiform of left foot, initial encounter          DISPOSITION/PLAN   DISPOSITION Decision To Discharge 05/02/2021 05:34:11 PM      PATIENT REFERRED TO:  [facility]) Pre-Services  [phone]  Schedule an appointment as soon as possible for a visit in 2 days  for re-evaluation    [facility] [address]    [phone]  Call   to be re evaluated., If symptoms worsen    [facility] ED  [address] [place]  [phone]    If symptoms worsen      DISCHARGE MEDICATIONS:  Discharge Medication List as of 5/2/2021  5:33 PM      START taking these medications    Details   oxyCODONE-acetaminophen (PERCOCET) 5-325 MG per tablet Take 1 tablet by mouth every 6 hours as needed for Pain for up to 3 days. WARNING:  May cause drowsiness. May impair ability to operate vehicles or machinery. Do not use in combination with alcohol., Disp-10 tablet, R-0Print      naproxen (NAPROSYN) 500 MG tablet Take 1 tablet by mouth 2 times daily for 20 doses This should not cause any sedation.  You can take naproxen and percocet together if needed or you can alternate., Disp-20 tablet, R-0Print             DISCONTINUED MEDICATIONS:  Discharge Medication List as of 5/2/2021  5:33 PM                 (Please note that portions of this note were completed with a voice recognition program.  Efforts were made to edit the dictations but occasionally words are mis-transcribed.)    EMIGDIO Giron CNP (electronically signed)         EMIGDIO Giron CNP  05/02/21 [id number] EMIGDIO Guo CNP  [facility], APRN - CNP  05/27/21 [id number] [facility], APRN - CNP  07/15/21 [id number]

## 2021-05-03 ENCOUNTER — TELEPHONE (OUTPATIENT)
Dept: ORTHOPEDIC SURGERY | Age: 46
End: 2021-05-03

## 2021-05-05 ENCOUNTER — OFFICE VISIT (OUTPATIENT)
Dept: ORTHOPEDIC SURGERY | Age: 46
End: 2021-05-05
Payer: COMMERCIAL

## 2021-05-05 VITALS — WEIGHT: 210 LBS | BODY MASS INDEX: 28.44 KG/M2 | HEIGHT: 72 IN

## 2021-05-05 DIAGNOSIS — S92.035A CLOSED NONDISPLACED AVULSION FRACTURE OF TUBEROSITY OF LEFT CALCANEUS, INITIAL ENCOUNTER: Primary | ICD-10-CM

## 2021-05-05 PROBLEM — R53.81 MALAISE AND FATIGUE: Status: ACTIVE | Noted: 2019-06-12

## 2021-05-05 PROBLEM — E66.3 OVERWEIGHT (BMI 25.0-29.9): Status: ACTIVE | Noted: 2019-08-08

## 2021-05-05 PROBLEM — E55.9 VITAMIN D INSUFFICIENCY: Status: ACTIVE | Noted: 2019-06-12

## 2021-05-05 PROBLEM — E88.819 INSULIN RESISTANCE: Status: ACTIVE | Noted: 2020-03-12

## 2021-05-05 PROBLEM — E03.9 ACQUIRED HYPOTHYROIDISM: Status: ACTIVE | Noted: 2019-07-01

## 2021-05-05 PROBLEM — G47.00 INSOMNIA: Status: ACTIVE | Noted: 2019-08-08

## 2021-05-05 PROBLEM — E88.81 INSULIN RESISTANCE: Status: ACTIVE | Noted: 2020-03-12

## 2021-05-05 PROBLEM — R53.83 MALAISE AND FATIGUE: Status: ACTIVE | Noted: 2019-06-12

## 2021-05-05 PROBLEM — R79.89 LOW SERUM PROGESTERONE: Status: ACTIVE | Noted: 2019-08-08

## 2021-05-05 PROBLEM — R19.7 DIARRHEA: Status: ACTIVE | Noted: 2019-06-12

## 2021-05-05 PROCEDURE — 99243 OFF/OP CNSLTJ NEW/EST LOW 30: CPT | Performed by: ORTHOPAEDIC SURGERY

## 2021-05-05 RX ORDER — LEVOTHYROXINE, LIOTHYRONINE 38; 9 UG/1; UG/1
TABLET ORAL
COMMUNITY
Start: 2021-04-06 | End: 2022-05-02

## 2021-05-05 RX ORDER — PROGESTERONE 100 %
POWDER (GRAM) MISCELLANEOUS
COMMUNITY
End: 2022-05-02

## 2021-05-05 NOTE — LETTER
[address] [place] [address] [facility] [address]  Phone: [phone]  Fax: [phone] Lea Slater MD        May 5, 2021     Patient: Kavon Nixon   YOB: 1975   Date of Visit: 5/5/2021       To Whom It May Concern: It is my medical opinion that Brandyn Weaver may return to light duty immediately with the following restrictions: Sedentary duty only, no field work. Continue to wear cast boot. .    If you have any questions or concerns, please don't hesitate to call.     Sincerely,          Nati Mukherjee MD

## 2021-05-05 NOTE — PROGRESS NOTES
by mouth 2 times daily for 20 doses This should not cause any sedation. You can take naproxen and percocet together if needed or you can alternate. 20 tablet 0    sertraline (ZOLOFT) 100 MG tablet Take 50 mg by mouth daily        No current facility-administered medications for this visit.         ALLERGIES   No Known Allergies    FAMILY HISTORY     Family History   Problem Relation Age of Onset    High Cholesterol Mother     Cancer Maternal Grandmother     Cancer Maternal Grandfather     Other Daughter         global apraxia, childhood apraxia of speech       SOCIAL HISTORY     Social History     Socioeconomic History    Marital status:      Spouse name: None    Number of children: None    Years of education: None    Highest education level: None   Occupational History    None   Social Needs    Financial resource strain: None    Food insecurity     Worry: None     Inability: None    Transportation needs     Medical: None     Non-medical: None   Tobacco Use    Smoking status: Never Smoker    Smokeless tobacco: Never Used   Substance and Sexual Activity    Alcohol use: No    Drug use: No    Sexual activity: Yes     Partners: Male     Comment: hai = Donald Ibrahim   Lifestyle    Physical activity     Days per week: None     Minutes per session: None    Stress: None   Relationships    Social connections     Talks on phone: None     Gets together: None     Attends Mandaeism service: None     Active member of club or organization: None     Attends meetings of clubs or organizations: None     Relationship status: None    Intimate partner violence     Fear of current or ex partner: None     Emotionally abused: None     Physically abused: None     Forced sexual activity: None   Other Topics Concern    None   Social History Narrative    None       REVIEW OF SYSTEMS   General: no fever, chills, night sweats, anorexia, malaise, fatigue, or weight change  Hematologic:  no unexplained bleeding or bruising  HEENT:   no nasal congestion, rhinorrhea, sore throat, or facial pain  Respiratory:  no cough, dyspnea, or chest pain  Cardiovascular:  no angina, LANG, PND, orthopnea, dependent edema, or palpitations  Gastrointestinal:  no nausea, vomiting, diarrhea, constipation, or abdominal pain  Genitourinary:  no urinary urgency, frequency, dysuria, or hematuria  Musculoskeletal: see HPI  Endocrine:  no heat or cold intolerance and no polyphagia, polydipsia, or polyuria  Skin:  no skin eruptions or changing lesions  Neurologic:  no focal weakness, numbness/tingling, tremor, or severe headache. See HPI. See HPI for pertinent positives. PHYSICAL EXAM   Vital Signs:   Vitals:    05/05/21 0906   Weight: 210 lb (95.3 kg)   Height: 6' (1.829 m)       General appearance: healthy, alert, no distress  Skin: Skin color, texture, turgor normal. No rashes or lesions  HEENT: atraumatic, normocephalic. PERRL  Respiratory: Unlabored breathing  Lymphatic: No adenopathy   Neuro: Alert and oriented, normal distal sensation, normal bilateral DTRs  Vascular: Normal distal capillary and distal pulses  Muskuloskeletal Exam: Left foot and ankle examination demonstrates diffuse swelling and ecchymosis in the dorsal of the foot extending to the lateral ankle and hindfoot. There is tenderness to palpation diffusely with more exquisite tenderness in the lateral midfoot. There is no ligament instability. RADIOLOGY   X-rays obtained and reviewed in office:  Views left foot and left ankle 3 views    Impression: Tiny avulsion fracture noted at the anterior process of the calcaneus without significant displacement    IMPRESSION     1. Closed nondisplaced avulsion fracture of tuberosity of left calcaneus, initial encounter         PLAN   I had a lengthy discussion with patient today regarding diagnosis and treatment options and recommendations. Recommend: Nonoperative management of the injury.   Continue with cast boot immobilization for

## 2021-06-02 ENCOUNTER — OFFICE VISIT (OUTPATIENT)
Dept: ORTHOPEDIC SURGERY | Age: 46
End: 2021-06-02

## 2021-06-02 VITALS — WEIGHT: 210 LBS | HEIGHT: 72 IN | BODY MASS INDEX: 28.44 KG/M2

## 2021-06-02 DIAGNOSIS — S92.035D CLOSED NONDISPLACED AVULSION FRACTURE OF TUBEROSITY OF LEFT CALCANEUS WITH ROUTINE HEALING, SUBSEQUENT ENCOUNTER: Primary | ICD-10-CM

## 2021-06-02 PROCEDURE — 99024 POSTOP FOLLOW-UP VISIT: CPT | Performed by: ORTHOPAEDIC SURGERY

## 2021-06-02 NOTE — PROGRESS NOTES
[facility]  Outpatient Progress Note  Mena Herrera MD    Patient Name: Concha Badillo MRN: [id number]   Age: 50 y.o. YOB: 1975   Sex: female      [email or web address] Complaint   Patient presents with    1 Month Follow-Up     left ankle       HISTORY OF PRESENT ILLNESS   Concha Badillo is a 50 y.o. female  The patient presents for follow up on their fracture. She reports her left foot and ankle pain are improved. She still has a little bit of swelling. She is able to go short distances in a regular gym shoe or barefoot. Still been wearing a tall cast boot for walking long distances    Pain Assessment  Location of Pain: Ankle  Location Modifiers: Left  Severity of Pain: 2  Quality of Pain: Dull  Duration of Pain: A few minutes  Frequency of Pain: Several times daily  Aggravating Factors: Walking  Limiting Behavior: No  Relieving Factors: Rest, Ice  Result of Injury: No  Work-Related Injury: No  Are there other pain locations you wish to document?: No    PAST MEDICAL HISTORY      Past Medical History:   Diagnosis Date    Anxiety     Gamekeeper thumb     Meniere disease     Spontaneous vaginal delivery         Varicella        PAST SURGICAL HISTORY     Past Surgical History:   Procedure Laterality Date    BREAST ENHANCEMENT SURGERY      CHOLECYSTECTOMY  2006    Laparoscopy    EYE SURGERY      strabismus       MEDICATIONS     Current Outpatient Medications   Medication Sig Dispense Refill    NP THYROID 60 MG tablet 1 TABLET 1 HR BEFORE MORNING AND EVENING MEALS      Progesterone Milled POWD       naproxen (NAPROSYN) 500 MG tablet Take 1 tablet by mouth 2 times daily for 20 doses This should not cause any sedation. You can take naproxen and percocet together if needed or you can alternate. 20 tablet 0    sertraline (ZOLOFT) 100 MG tablet Take 50 mg by mouth daily        No current facility-administered medications for this visit. facial pain  Respiratory:  no cough, dyspnea, or chest pain  Cardiovascular:  no angina, LANG, PND, orthopnea, dependent edema, or palpitations  Gastrointestinal:  no nausea, vomiting, diarrhea, constipation, or abdominal pain  Genitourinary:  no urinary urgency, frequency, dysuria, or hematuria  Musculoskeletal: see HPI  Endocrine:  no heat or cold intolerance and no polyphagia, polydipsia, or polyuria  Skin:  no skin eruptions or changing lesions  Neurologic:  no focal weakness, numbness/tingling, tremor, or severe headache. See HPI. See HPI for pertinent positives. PHYSICAL EXAM   Vital Signs: Ht 6' (1.829 m)   Wt 210 lb (95.3 kg)   BMI 28.48 kg/m²     General appearance: healthy, alert, no distress  Skin: Skin color, texture, turgor normal. No rashes or lesions  HEENT: atraumatic, normocephalic. PERRL  Respiratory: Unlabored breathing  Lymphatic: No adenopathy   Neuro: Alert and oriented, normal distal sensation, normal bilateral DTRs  Vascular: Normal distal capillary and distal pulses  Muskuloskeletal Exam:   Left foot does demonstrate just mild dorsal edema. No tenderness at the anterior process of the calcaneus. Ankle range of motion is full. There is no ligament instability    RADIOLOGY   No new radiographs taken today    IMPRESSION     1. Closed nondisplaced avulsion fracture of tuberosity of left calcaneus with routine healing, subsequent encounter           PLAN   Transition from walking cast boot into a sturdy accommodative athletic shoe. Patient instructed on home exercises for range of motion strengthening of the foot and ankle. If she does not continue to improve consider referral to physical therapy. FOLLOWUP     Return if symptoms worsen or fail to improve. No orders of the defined types were placed in this encounter. No orders of the defined types were placed in this encounter.       Patient was instructed on appropriate use of braces, participation in home exercise programs, healthy lifestyle choices and weight loss as appropriate     Rhonda Martin MD

## 2021-06-02 NOTE — PATIENT INSTRUCTIONS
Patient Education        Ankle Sprain: Rehab Exercises  Introduction  Here are some examples of exercises for you to try. The exercises may be suggested for a condition or for rehabilitation. Start each exercise slowly. Ease off the exercises if you start to have pain. You will be told when to start these exercises and which ones will work best for you. How to do the exercises  'Alphabet' exercise   1. Trace the alphabet with your toe. This helps your ankle move in all directions. Side-to-side knee swing exercise   1. Sit in a chair with your foot flat on the floor. 2. Slowly move your knee from side to side. Keep your foot pressed flat. 3. Continue this exercise for 2 to 3 minutes. Towel curl   1. While sitting, place your foot on a towel on the floor. Scrunch the towel toward you with your toes. 2. Then use your toes to push the towel away from you. 3. To make this exercise more challenging you can put something on the other end of the towel. A can of soup is about the right weight for this. Towel stretch   1. Sit with your legs extended and knees straight. 2. Place a towel around your foot just under the toes. 3. Hold each end of the towel in each hand, with your hands above your knees. 4. Pull back with the towel so that your foot stretches toward you. 5. Hold the position for at least 15 to 30 seconds. 6. Repeat 2 to 4 times a session. Do up to 5 sessions a day. Ankle eversion exercise   1. Start by sitting with your foot flat on the floor. Push your foot outward against a wall or a piece of furniture that doesn't move. Hold for about 6 seconds, and relax. Repeat 8 to 12 times. 2. After you feel comfortable with this, try using rubber tubing looped around the outside of your feet for resistance. Push your foot out to the side against the tubing, and then count to 10 as you slowly bring your foot back to the middle. Repeat 8 to 12 times. Isometric opposition exercises   1.  While sitting, put your feet together flat on the floor. 2. Press your injured foot inward against your other foot. Hold for about 6 seconds, and relax. Repeat 8 to 12 times. 3. Then place the heel of your other foot on top of the injured one. Push down with the top heel while trying to push up with your injured foot. Hold for about 6 seconds, and relax. Repeat 8 to 12 times. Resisted ankle inversion   1. Sit on the floor with your good leg crossed over your other leg. 2. Hold both ends of an exercise band and loop the band around the inside of your affected foot. Then press your other foot against the band. 3. Keeping your legs crossed, slowly push your affected foot against the band so that foot moves away from your other foot. Then slowly relax. 4. Repeat 8 to 12 times. Resisted ankle eversion   1. Sit on the floor with your legs straight. 2. Hold both ends of an exercise band and loop the band around the outside of your affected foot. Then press your other foot against the band. 3. Keeping your leg straight, slowly push your affected foot outward against the band and away from your other foot without letting your leg rotate. Then slowly relax. 4. Repeat 8 to 12 times. Resisted ankle dorsiflexion   1. Tie the ends of an exercise band together to form a loop. Attach one end of the loop to a secure object or shut a door on it to hold it in place. (Or you can have someone hold one end of the loop to provide resistance.)  2. While sitting on the floor or in a chair, loop the other end of the band over the top of your affected foot. 3. Keeping your knee and leg straight, slowly flex your foot to pull back on the exercise band, and then slowly relax. 4. Repeat 8 to 12 times. Single-leg balance   1. Stand on a flat surface with your arms stretched out to your sides like you are making the letter \"T. \" Then lift your good leg off the floor, bending it at the knee.  If you are not steady on your feet, use one hand to hold on to a chair, counter, or wall. 2. Standing on the leg with your affected ankle, keep that knee straight. Try to balance on that leg for up to 30 seconds. Then rest for up to 10 seconds. 3. Repeat 6 to 8 times. 4. When you can balance on your affected leg for 30 seconds with your eyes open, try to balance on it with your eyes closed. 5. When you can do this exercise with your eyes closed for 30 seconds and with ease and no pain, try standing on a pillow or piece of foam, and repeat steps 1 through 4. Follow-up care is a key part of your treatment and safety. Be sure to make and go to all appointments, and call your doctor if you are having problems. It's also a good idea to know your test results and keep a list of the medicines you take. Where can you learn more? Go to [email or web address] org and sign in to your [employer] account. Enter Zoie Garcia in the [employer] box to learn more about \"Ankle Sprain: Rehab Exercises. \"     If you do not have an account, please click on the \"Sign Up Now\" link. Current as of: November 16, 2020               Content Version: 12.8  © 2006-2021 [employer]. Care instructions adapted under license by [facility] ([facility]). If you have questions about a medical condition or this instruction, always ask your healthcare professional. [place] [address] any warranty or liability for your use of this information.

## 2022-02-23 LAB
T4 FREE: 0.8
TSH SERPL DL<=0.05 MIU/L-ACNC: 0.03 UIU/ML

## 2022-05-02 ENCOUNTER — OFFICE VISIT (OUTPATIENT)
Dept: INTERNAL MEDICINE CLINIC | Age: 47
End: 2022-05-02
Payer: COMMERCIAL

## 2022-05-02 VITALS
HEART RATE: 98 BPM | WEIGHT: 228 LBS | HEIGHT: 72 IN | SYSTOLIC BLOOD PRESSURE: 114 MMHG | OXYGEN SATURATION: 99 % | DIASTOLIC BLOOD PRESSURE: 66 MMHG | BODY MASS INDEX: 30.88 KG/M2

## 2022-05-02 DIAGNOSIS — E03.9 ACQUIRED HYPOTHYROIDISM: ICD-10-CM

## 2022-05-02 DIAGNOSIS — F41.9 ANXIETY: ICD-10-CM

## 2022-05-02 DIAGNOSIS — Z00.00 ANNUAL PHYSICAL EXAM: Primary | ICD-10-CM

## 2022-05-02 DIAGNOSIS — E55.9 VITAMIN D INSUFFICIENCY: ICD-10-CM

## 2022-05-02 DIAGNOSIS — E66.09 CLASS 1 OBESITY DUE TO EXCESS CALORIES WITHOUT SERIOUS COMORBIDITY WITH BODY MASS INDEX (BMI) OF 30.0 TO 30.9 IN ADULT: ICD-10-CM

## 2022-05-02 DIAGNOSIS — Z12.11 SCREEN FOR COLON CANCER: ICD-10-CM

## 2022-05-02 DIAGNOSIS — Z23 NEED FOR TETANUS, DIPHTHERIA, AND ACELLULAR PERTUSSIS (TDAP) VACCINE IN PATIENT OF ADOLESCENT AGE OR OLDER: ICD-10-CM

## 2022-05-02 DIAGNOSIS — K58.0 IRRITABLE BOWEL SYNDROME WITH DIARRHEA: ICD-10-CM

## 2022-05-02 DIAGNOSIS — H10.45 OTHER CHRONIC ALLERGIC CONJUNCTIVITIS OF BOTH EYES: ICD-10-CM

## 2022-05-02 DIAGNOSIS — F51.01 PRIMARY INSOMNIA: ICD-10-CM

## 2022-05-02 PROBLEM — R31.1 BENIGN ESSENTIAL MICROSCOPIC HEMATURIA: Status: RESOLVED | Noted: 2019-05-30 | Resolved: 2022-05-02

## 2022-05-02 PROBLEM — E88.819 INSULIN RESISTANCE: Status: RESOLVED | Noted: 2020-03-12 | Resolved: 2022-05-02

## 2022-05-02 PROBLEM — Z90.49 STATUS POST CHOLECYSTECTOMY: Status: RESOLVED | Noted: 2019-05-30 | Resolved: 2022-05-02

## 2022-05-02 PROBLEM — E88.81 INSULIN RESISTANCE: Status: RESOLVED | Noted: 2020-03-12 | Resolved: 2022-05-02

## 2022-05-02 PROCEDURE — 90471 IMMUNIZATION ADMIN: CPT | Performed by: INTERNAL MEDICINE

## 2022-05-02 PROCEDURE — 99386 PREV VISIT NEW AGE 40-64: CPT | Performed by: INTERNAL MEDICINE

## 2022-05-02 PROCEDURE — 99203 OFFICE O/P NEW LOW 30 MIN: CPT | Performed by: INTERNAL MEDICINE

## 2022-05-02 PROCEDURE — 90715 TDAP VACCINE 7 YRS/> IM: CPT | Performed by: INTERNAL MEDICINE

## 2022-05-02 RX ORDER — DICYCLOMINE HYDROCHLORIDE 10 MG/1
CAPSULE ORAL
Qty: 90 CAPSULE | Refills: 0 | Status: SHIPPED | OUTPATIENT
Start: 2022-05-02 | End: 2022-11-01 | Stop reason: SDUPTHER

## 2022-05-02 RX ORDER — PROGESTERONE 200 MG/1
600 CAPSULE ORAL NIGHTLY
COMMUNITY

## 2022-05-02 RX ORDER — TRAZODONE HYDROCHLORIDE 50 MG/1
TABLET ORAL
Qty: 30 TABLET | Refills: 0 | Status: SHIPPED | OUTPATIENT
Start: 2022-05-02 | End: 2022-06-01 | Stop reason: DRUGHIGH

## 2022-05-02 RX ORDER — LEVOTHYROXINE, LIOTHYRONINE 57; 13.5 UG/1; UG/1
180 TABLET ORAL DAILY
COMMUNITY
Start: 2022-04-13 | End: 2022-05-02 | Stop reason: SDUPTHER

## 2022-05-02 RX ORDER — LEVOTHYROXINE, LIOTHYRONINE 57; 13.5 UG/1; UG/1
180 TABLET ORAL DAILY
Qty: 180 TABLET | Refills: 1 | Status: SHIPPED | OUTPATIENT
Start: 2022-05-02 | End: 2022-11-01 | Stop reason: SDUPTHER

## 2022-05-02 SDOH — ECONOMIC STABILITY: FOOD INSECURITY: WITHIN THE PAST 12 MONTHS, YOU WORRIED THAT YOUR FOOD WOULD RUN OUT BEFORE YOU GOT MONEY TO BUY MORE.: NEVER TRUE

## 2022-05-02 SDOH — ECONOMIC STABILITY: FOOD INSECURITY: WITHIN THE PAST 12 MONTHS, THE FOOD YOU BOUGHT JUST DIDN'T LAST AND YOU DIDN'T HAVE MONEY TO GET MORE.: NEVER TRUE

## 2022-05-02 ASSESSMENT — PATIENT HEALTH QUESTIONNAIRE - PHQ9
SUM OF ALL RESPONSES TO PHQ QUESTIONS 1-9: 0
2. FEELING DOWN, DEPRESSED OR HOPELESS: 0
1. LITTLE INTEREST OR PLEASURE IN DOING THINGS: 0
SUM OF ALL RESPONSES TO PHQ QUESTIONS 1-9: 0
SUM OF ALL RESPONSES TO PHQ9 QUESTIONS 1 & 2: 0

## 2022-05-02 ASSESSMENT — SOCIAL DETERMINANTS OF HEALTH (SDOH): HOW HARD IS IT FOR YOU TO PAY FOR THE VERY BASICS LIKE FOOD, HOUSING, MEDICAL CARE, AND HEATING?: NOT HARD AT ALL

## 2022-05-02 NOTE — PROGRESS NOTES
[facility]  History and Physical  Gabrielle Piña M.D.        Swapnil Cabello  YOB: 1975    Date of Service:  5/2/2022    Chief Complaint:   Swapnil Cabello is a 48 y.o. female who presents for   Chief Complaint   Patient presents with    Establish Care       Assessment/Plan:    Yanci Robertson was seen today for establish care. Diagnoses and all orders for this visit:    Annual physical exam    Screen for colon cancer  -     Fecal DNA Colorectal cancer screening (Cologuard)    Need for tetanus, diphtheria, and acellular pertussis (Tdap) vaccine in patient of adolescent age or older  -     Tdap (age 6y and older) IM (BitInstant Extension)    Acquired hypothyroidism  -     NP THYROID 90 MG tablet; Take 2 tablets by mouth daily    Primary insomnia  Start traZODone (DESYREL) 50 MG tablet; 1/2-2 po an hour prior to bed    Irritable bowel syndrome with diarrhea  Start dicyclomine (BENTYL) 10 MG capsule; 1-2 po qac as needed diarrhea    Anxiety  Stable and continue on current treatment    Other chronic allergic conjunctivitis of both eyes  Stable and continue on current treatment    Vitamin D insufficiency  Stable and continue on current treatment    Class 1 obesity due to excess calories without serious comorbidity with body mass index (BMI) of 30.0 to 30.9 in adult    Goals:   -Eat small amounts of food 4-5 times daily  -Avoid high fat and high sugar foods  -Include protein with all meals and snacks  -Avoid carbonation and caffeine  -Avoid calorie containing beverages  -Increase physical activity as tolerated      Return VV 1 month on sleep and ibs with diarrhea. HPI: Here for Annual Physical and Follow up. She's been having problems falling asleep for many years. She uses Benadryl 4 evening and only get about 4 hrs of sleep. Failed melatonin. IBS with diarrhea:  Uses over the counter med as needed 2-3 times a week depending on what she eats.     She will discuss with GYN regarding progesterone replacement since I'm not sure why she's on it. Hypothyroidism: Recent symptoms: none. Stable on NP thryoid 90 mg 2 qam.  She denies fatigue, weight gain, weight loss, cold intolerance, heat intolerance, hair loss, dry skin, constipation, diarrhea, edema, anxiety, tremor, palpitations and dysphagia. Patient is  taking her medication consistently on an empty stomach.   Anxiety:  Stable on Zoloft 100 mg daily per GYN        Lab Results   Component Value Date    LABA1C 5.5 05/30/2019    LABMICR YES 05/23/2019     Lab Results   Component Value Date     03/12/2020    K 4.9 03/12/2020     03/12/2020    CO2 26 03/12/2020    BUN 23 03/12/2020    CREATININE 0.8 03/12/2020    GLUCOSE 99 03/12/2020    CALCIUM 9.3 03/12/2020     Lab Results   Component Value Date    CHOL 147 03/12/2020    TRIG 40 03/12/2020    HDL 57 03/12/2020    HDL 44 11/22/2011    LDLCALC 82 03/12/2020     Lab Results   Component Value Date    ALT 14 03/12/2020    AST 18 03/12/2020     Lab Results   Component Value Date    TSH 0.033 02/23/2022    TSH 3.88 09/29/2017    T4FREE 0.80 02/23/2022    T4FREE 0.9 09/29/2017     Lab Results   Component Value Date    WBC 4.0 03/12/2020    HGB 14.8 03/12/2020    HCT 42.5 03/12/2020    MCV 91.4 03/12/2020     03/12/2020     No results found for: INR   No results found for: PSA   No results found for: LABURIC         Wt Readings from Last 3 Encounters:   05/02/22 228 lb (103.4 kg)   06/02/21 210 lb (95.3 kg)   05/05/21 210 lb (95.3 kg)     BP Readings from Last 3 Encounters:   05/02/22 114/66   05/02/21 (!) 141/78   05/30/19 130/74       Patient Active Problem List   Diagnosis    Anxiety    Irritable bowel syndrome with diarrhea    Status post cholecystectomy    Blood glucose elevated    Benign essential microscopic hematuria    Class 1 obesity due to excess calories without serious comorbidity with body mass index (BMI) of 30.0 to 30.9 in adult    Acquired hypothyroidism    Closed fracture of middle or proximal phalanx or phalanges of hand    Diarrhea    Insomnia    Insulin resistance    Low serum progesterone    Malaise and fatigue    Meniere's disease    Orthopedic aftercare    Other chronic allergic conjunctivitis    Overweight (BMI 25.0-29. 9)    Pain in limb    Syncope and collapse    Vitamin D insufficiency       No Known Allergies  Outpatient Medications Marked as Taking for the 22 encounter (Office Visit) with Mahi Ortiz MD   Medication Sig Dispense Refill    NP THYROID 90 MG tablet Take 180 mg by mouth daily      progesterone (PROMETRIUM) 200 MG CAPS capsule Take 600 mg by mouth at bedtime      sertraline (ZOLOFT) 100 MG tablet Take 100 mg by mouth daily          Past Medical History:   Diagnosis Date    Anxiety     Gamekeeper thumb     IBS (irritable bowel syndrome)     Meniere disease     Spontaneous vaginal delivery         Varicella      Past Surgical History:   Procedure Laterality Date    BREAST ENHANCEMENT SURGERY      BREAST ENHANCEMENT SURGERY      CHOLECYSTECTOMY  2006    Laparoscopy    EYE SURGERY Bilateral 1978    strabismus     Family History   Problem Relation Age of Onset    High Cholesterol Mother     No Known Problems Father     Cancer Maternal Grandmother     Cancer Maternal Grandfather     Other Daughter         global apraxia, childhood apraxia of speech    Osteoarthritis Sister      Social History     Socioeconomic History    Marital status:      Spouse name: Not on file    Number of children: Not on file    Years of education: Not on file    Highest education level: Not on file   Occupational History    Not on file   Tobacco Use    Smoking status: Never Smoker    Smokeless tobacco: Never Used   Vaping Use    Vaping Use: Never used   Substance and Sexual Activity    Alcohol use: No    Drug use: No    Sexual activity: Yes     Partners: Male     Comment: hai = Stan Koch   Other Topics Concern    Not on file   Social History Narrative    Not on file     Social Determinants of Health     Financial Resource Strain: Low Risk     Difficulty of Paying Living Expenses: Not hard at all   Food Insecurity: No Food Insecurity    Worried About Running Out of Food in the Last Year: Never true    [address] in the Last Year: Never true   Transportation Needs:     Lack of Transportation (Medical): Not on file    Lack of Transportation (Non-Medical): Not on file   Physical Activity:     Days of Exercise per Week: Not on file    Minutes of Exercise per Session: Not on file   Stress:     Feeling of Stress : Not on file   Social Connections:     Frequency of Communication with Friends and Family: Not on file    Frequency of Social Gatherings with Friends and Family: Not on file    Attends Jain Services: Not on file    Active Member of [address] [place] [address] or Organizations: Not on file    Attends Club or Organization Meetings: Not on file    Marital Status: Not on file   Intimate Partner Violence:     Fear of Current or Ex-Partner: Not on file    Emotionally Abused: Not on file    Physically Abused: Not on file    Sexually Abused: Not on file   Housing Stability:     Unable to Pay for Housing in the Last Year: Not on file    Number of [place] in the Last Year: Not on file    Unstable Housing in the Last Year: Not on file       Review of Systems:  A comprehensive review of systems was negative except for what was noted in the HPI. Physical Exam:   Vitals:    05/02/22 1105   BP: 114/66   Pulse: 98   SpO2: 99%   Weight: 228 lb (103.4 kg)   Height: 6' (1.829 m)     Body mass index is 30.92 kg/m². Constitutional: She is oriented to person, place, and time. She appears well-developed and well-nourished. No distress. HEENT:   Head: Normocephalic and atraumatic.    Right Ear: Tympanic membrane, external ear and ear canal normal.   Left Ear: Tympanic membrane, external ear and ear canal normal. Mouth/Throat: Oropharynx is clear and moist, and mucous membranes are normal.  There is no cervical adenopathy. Eyes: Conjunctivae and extraocular motions are normal. Pupils are equal, round, and reactive to light. Neck: Supple. No JVD present. Carotid bruit is not present. No mass and no thyromegaly present. Cardiovascular: Normal rate, regular rhythm, normal heart sounds and intact distal pulses. Pulmonary/Chest: Effort normal and breath sounds normal. No respiratory distress. She has no wheezes, rhonchi or rales. Abdominal: Soft, non-tender. Bowel sounds and aorta are normal. She exhibits no organomegaly, mass or bruit. Musculoskeletal: Normal range of motion, no synovitis. She exhibits no edema. Neurological: She is alert and oriented to person, place, and time. She has normal reflexes. No cranial nerve deficit. Coordination normal.   Skin: Skin is warm and dry. There is no rash or erythema. No suspicious lesions noted.        Preventive Care:  Health Maintenance   Topic Date Due    Depression Screen  Never done    Hepatitis C screen  Never done    Colorectal Cancer Screen  Never done    Cervical cancer screen  10/19/2019    DTaP/Tdap/Td vaccine (2 - Td or Tdap) 11/22/2021    HIV screen  12/31/2025 (Originally 6/14/1988)    TSH  02/23/2023    Lipids  03/12/2025    Flu vaccine  Completed    COVID-19 Vaccine  Completed    Hepatitis A vaccine  Aged Out    Hepatitis B vaccine  Aged Out    Hib vaccine  Aged Out    Meningococcal (ACWY) vaccine  Aged Out    Pneumococcal 0-64 years Vaccine  Aged Out        Recommendations for [place] Due: see orders and patient instructions/AVS.

## 2022-05-26 LAB — NONINV COLON CA DNA+OCC BLD SCRN STL QL: NEGATIVE

## 2022-05-31 ENCOUNTER — TELEMEDICINE (OUTPATIENT)
Dept: INTERNAL MEDICINE CLINIC | Age: 47
End: 2022-05-31
Payer: COMMERCIAL

## 2022-05-31 DIAGNOSIS — F51.01 PRIMARY INSOMNIA: Primary | ICD-10-CM

## 2022-05-31 DIAGNOSIS — K58.2 IRRITABLE BOWEL SYNDROME WITH BOTH CONSTIPATION AND DIARRHEA: ICD-10-CM

## 2022-05-31 PROCEDURE — 99213 OFFICE O/P EST LOW 20 MIN: CPT | Performed by: INTERNAL MEDICINE

## 2022-05-31 NOTE — PROGRESS NOTES
Pursuant to the emergency declaration under the [facility] authority and the [employer] Act, this Virtual  Video Visit was conducted, with patient's consent, to reduce the patient's risk of exposure to COVID-19 and provide continuity of care. Service is  provided through a video synchronous discussion virtually to substitute for in-person clinic visit with the patient being at home and Dr. Erasmo Corea being at home. Patient consent to the video visit. Date of Service:  5/31/2022    Chief Complaint:      Chief Complaint   Patient presents with    Insomnia    Irritable Bowel Syndrome       Assessment/Plan:    Tracie Olson was seen today for insomnia and irritable bowel syndrome. Diagnoses and all orders for this visit:    Primary insomnia  -     traZODone (DESYREL) 150 MG tablet; Take 1 tablet by mouth nightly    Irritable bowel syndrome with both constipation and diarrhea    Stable and continue on current medications. Return VV Nov 1 at 9 Sleep, thyroid, IBS, anxiety. HPI:  Madie Francois is a 48 y.o. Insomnia:  Improved but still having a hard time fallen asleep but once she's asleep, she's able to sleep the whole night on Trazodone 100 mg evening. Failed melatonin.     IBS with diarrhea:  improve on Bentyl 10 mg as needed when going out 1-2 times a week.     She will discuss with GYN regarding progesterone replacement since I'm not sure why she's on it.     Hypothyroidism: Recent symptoms: none. Stable on NP thryoid 90 mg 2 qam.  She denies fatigue, weight gain, weight loss, cold intolerance, heat intolerance, hair loss, dry skin, constipation, diarrhea, edema, anxiety, tremor, palpitations and dysphagia. Patient is  taking her medication consistently on an empty stomach.   Anxiety:  Stable on Zoloft 100 mg daily per GYN    Lab Results   Component Value Date    LABA1C 5.5 05/30/2019    LABMICR YES 05/23/2019     Lab Results   Component Value Date     03/12/2020    K 4.9 03/12/2020     03/12/2020    CO2 26 03/12/2020    BUN 23 03/12/2020    CREATININE 0.8 03/12/2020    GLUCOSE 99 03/12/2020    CALCIUM 9.3 03/12/2020     Lab Results   Component Value Date    CHOL 147 03/12/2020    TRIG 40 03/12/2020    HDL 57 03/12/2020    HDL 44 11/22/2011    LDLCALC 82 03/12/2020     Lab Results   Component Value Date    ALT 14 03/12/2020    AST 18 03/12/2020     Lab Results   Component Value Date    TSH 0.033 02/23/2022    TSH 3.88 09/29/2017    T4FREE 0.80 02/23/2022    T4FREE 0.9 09/29/2017     Lab Results   Component Value Date    WBC 4.0 03/12/2020    HGB 14.8 03/12/2020    HCT 42.5 03/12/2020    MCV 91.4 03/12/2020     03/12/2020     No results found for: INR   No results found for: PSA   No results found for: LABURIC     Patient Active Problem List   Diagnosis    Anxiety    Irritable bowel syndrome with diarrhea    Class 1 obesity due to excess calories without serious comorbidity with body mass index (BMI) of 30.0 to 30.9 in adult    Acquired hypothyroidism    Primary insomnia    Low serum progesterone    Meniere's disease    Other chronic allergic conjunctivitis    Vitamin D insufficiency       No Known Allergies  Outpatient Medications Marked as Taking for the 5/31/22 encounter (Telemedicine) with Koki Hsieh MD   Medication Sig Dispense Refill    progesterone (PROMETRIUM) 200 MG CAPS capsule Take 600 mg by mouth at bedtime      NP THYROID 90 MG tablet Take 2 tablets by mouth daily 180 tablet 1    traZODone (DESYREL) 50 MG tablet 1/2-2 po an hour prior to bed 30 tablet 0    dicyclomine (BENTYL) 10 MG capsule 1-2 po qac as needed diarrhea 90 capsule 0    sertraline (ZOLOFT) 100 MG tablet Take 100 mg by mouth daily            Review of Systems: 14 systems were negative except of what was stated on HPI    Nursing note and vitals reviewed.   There were no vitals filed for this visit. Wt Readings from Last 3 Encounters:   05/02/22 228 lb (103.4 kg)   06/02/21 210 lb (95.3 kg)   05/05/21 210 lb (95.3 kg)     BP Readings from Last 3 Encounters:   05/02/22 114/66   05/02/21 (!) 141/78   05/30/19 130/74     There is no height or weight on file to calculate BMI. Constitutional: Patient appears well-developed and well-nourished. No distress. Head: Normocephalic and atraumatic. Psychiatric: Normal mood and affect.  Behavior is normal.

## 2022-06-01 RX ORDER — TRAZODONE HYDROCHLORIDE 150 MG/1
150 TABLET ORAL NIGHTLY
Qty: 90 TABLET | Refills: 1 | Status: SHIPPED | OUTPATIENT
Start: 2022-06-01 | End: 2022-11-01 | Stop reason: SDUPTHER

## 2022-08-15 NOTE — TELEPHONE ENCOUNTER
Requested Prescriptions     Pending Prescriptions Disp Refills    LORazepam (ATIVAN) 0.5 MG tablet 15 tablet 0     Sig: Take 1 tablet by mouth daily as needed for Anxiety (limit use) for up to 30 days.    last visit: 05.05.17 16

## 2022-10-04 ENCOUNTER — OFFICE VISIT (OUTPATIENT)
Dept: ORTHOPEDIC SURGERY | Age: 47
End: 2022-10-04
Payer: COMMERCIAL

## 2022-10-04 DIAGNOSIS — S93.492S SPRAIN OF ANTERIOR TALOFIBULAR LIGAMENT OF LEFT ANKLE, SEQUELA: Primary | ICD-10-CM

## 2022-10-04 PROCEDURE — 99213 OFFICE O/P EST LOW 20 MIN: CPT | Performed by: ORTHOPAEDIC SURGERY

## 2022-10-04 NOTE — PROGRESS NOTES
[facility]  Outpatient Progress Note  Christie Montana MD    Patient Name: Clover Flowers MRN: [id number]   Age: 50 y.o. YOB: 1975   Sex: female      [address] Complaint   Patient presents with    Ankle Pain     Lateral Left ankle pain   Fall end of  estimated lateral pain and swelling  No improvement no recent imaging  H/o tx  with LLV        HISTORY OF PRESENT ILLNESS   Clover Flowers is a 50 y.o. female, I had treated about a year ago for a left foot avulsion fracture at the anterior calcaneal process which healed uneventfully. She had a new injury in the end of August this year where she had an inversion injury resulting in pain and swelling laterally and bruising which resolved over a couple of weeks. She is feeling improved but wanted to come in to be sure that there was not a more significant injury.   She had not sought medical evaluation prior to today    PAST MEDICAL HISTORY      Past Medical History:   Diagnosis Date    Anxiety     Benign essential microscopic hematuria 2019    Closed fracture of middle or proximal phalanx or phalanges of hand 2011    Gamekeeper thumb     IBS (irritable bowel syndrome)     Meniere disease     Pain in limb 2011    Spontaneous vaginal delivery         Status post cholecystectomy 2019    Varicella        PAST SURGICAL HISTORY     Past Surgical History:   Procedure Laterality Date    BREAST ENHANCEMENT SURGERY      BREAST ENHANCEMENT SURGERY      CHOLECYSTECTOMY  2006    Laparoscopy    EYE SURGERY Bilateral 1978    strabismus       MEDICATIONS     Current Outpatient Medications   Medication Sig Dispense Refill    traZODone (DESYREL) 150 MG tablet Take 1 tablet by mouth nightly 90 tablet 1    progesterone (PROMETRIUM) 200 MG CAPS capsule Take 600 mg by mouth at bedtime      NP THYROID 90 MG tablet Take 2 tablets by mouth daily 180 tablet 1    dicyclomine (BENTYL) 10 MG capsule 1-2 po qac as needed diarrhea 90 capsule 0    sertraline (ZOLOFT) 100 MG tablet Take 100 mg by mouth daily        No current facility-administered medications for this visit. ALLERGIES   No Known Allergies    FAMILY HISTORY     Family History   Problem Relation Age of Onset    High Cholesterol Mother     No Known Problems Father     Lung Cancer Maternal Grandmother     Colon Cancer Maternal Grandfather     Other Daughter         global apraxia, childhood apraxia of speech    Osteoporosis Sister     Ovarian Cancer Paternal Aunt        SOCIAL HISTORY     Social History     Socioeconomic History    Marital status:    Tobacco Use    Smoking status: Never    Smokeless tobacco: Never   Vaping Use    Vaping Use: Never used   Substance and Sexual Activity    Alcohol use: No    Drug use: No    Sexual activity: Yes     Partners: Male     Comment: viralb = Donald Ibrahim     Social Determinants of Health     Financial Resource Strain: Low Risk     Difficulty of Paying Living Expenses: Not hard at all   Food Insecurity: No Food Insecurity    Worried About Running Out of Food in the Last Year: Never true    Ran Out of Food in the Last Year: Never true       REVIEW OF SYSTEMS   General: no fever, chills, night sweats, anorexia, malaise, fatigue, or weight change  Hematologic:  no unexplained bleeding or bruising  HEENT:   no nasal congestion, rhinorrhea, sore throat, or facial pain  Respiratory:  no cough, dyspnea, or chest pain  Cardiovascular:  no angina, LANG, PND, orthopnea, dependent edema, or palpitations  Gastrointestinal:  no nausea, vomiting, diarrhea, constipation, or abdominal pain  Genitourinary:  no urinary urgency, frequency, dysuria, or hematuria  Musculoskeletal: see HPI  Endocrine:  no heat or cold intolerance and no polyphagia, polydipsia, or polyuria  Skin:  no skin eruptions or changing lesions  Neurologic:  no focal weakness, numbness/tingling, tremor, or severe headache.  See HPI.    See HPI for pertinent positives. PHYSICAL EXAM   Vital Signs: There were no vitals filed for this visit. General appearance: healthy, alert, no distress  Skin: Skin color, texture, turgor normal. No rashes or lesions  HEENT: atraumatic, normocephalic. PERRL  Respiratory: Unlabored breathing  Lymphatic: No adenopathy   Neuro: Alert and oriented, normal distal sensation, normal bilateral DTRs  Vascular: Normal distal capillary and distal pulses  Muskuloskeletal Exam: Left ankle examination is just mild lateral soft tissue swelling. Mild tenderness noted over the ATFL. Range of motion is full. There is no instability to anterior drawer varus stress testing. Weightbearing alignment is normal.  Gait is normal for    RADIOLOGY   X-rays obtained and reviewed in office:  Views of the left foot and left ankle 3 views    Impression: No acute bony abnormality    IMPRESSION     1. Sprain of anterior talofibular ligament of left ankle, sequela         PLAN   I had a lengthy discussion with patient today regarding diagnosis and treatment options and recommendations. Anticipate the injury to continue to heal with conservative measures. I have recommended shoewear modification, rest, ice, elevation and a home exercise program for range of motion and strengthening of the ankle. FOLLOWUP     Return if symptoms worsen or fail to improve. Orders Placed This Encounter   Procedures    XR FOOT LEFT (MIN 3 VIEWS)     Standing Status:   Future     Number of Occurrences:   1     Standing Expiration Date:   10/4/2023     Order Specific Question:   Reason for exam:     Answer:   pain    XR ANKLE LEFT (MIN 3 VIEWS)     Standing Status:   Future     Number of Occurrences:   1     Standing Expiration Date:   10/4/2023     Order Specific Question:   Reason for exam:     Answer:   pain      No orders of the defined types were placed in this encounter.       Patient was instructed on appropriate use of braces, participation in home exercise programs, healthy lifestyle choices and weight loss as appropriate     Kym Ladnon MD

## 2022-10-04 NOTE — PATIENT INSTRUCTIONS
Ankle Sprain: Rehab Exercises  Introduction  Here are some examples of exercises for you to try. The exercises may be suggested for a condition or for rehabilitation. Start each exercise slowly. Ease off the exercises if you start to have pain. You will be told when to start these exercises and which ones will work best for you. How to do the exercises  'Alphabet' exercise  Trace the alphabet with your toe. This helps your ankle move in all directions. Side-to-side knee swing exercise  Sit in a chair with your foot flat on the floor. Slowly move your knee from side to side. Keep your foot pressed flat. Continue this exercise for 2 to 3 minutes. Towel curl  While sitting, place your foot on a towel on the floor. Scrunch the towel toward you with your toes. Then use your toes to push the towel away from you. To make this exercise more challenging you can put something on the other end of the towel. A can of soup is about the right weight for this. Towel stretch  Sit with your legs extended and knees straight. Place a towel around your foot just under the toes. Hold each end of the towel in each hand, with your hands above your knees. Pull back with the towel so that your foot stretches toward you. Hold the position for at least 15 to 30 seconds. Repeat 2 to 4 times a session. Do up to 5 sessions a day. Ankle eversion exercise  Start by sitting with your foot flat on the floor. Push your foot outward against a wall or a piece of furniture that doesn't move. Hold for about 6 seconds, and relax. Repeat 8 to 12 times. After you feel comfortable with this, try using rubber tubing looped around the outside of your feet for resistance. Push your foot out to the side against the tubing, and then count to 10 as you slowly bring your foot back to the middle. Repeat 8 to 12 times. Isometric opposition exercises  While sitting, put your feet together flat on the floor.   Press your injured foot inward against your other foot. Hold for about 6 seconds, and relax. Repeat 8 to 12 times. Then place the heel of your other foot on top of the injured one. Push down with the top heel while trying to push up with your injured foot. Hold for about 6 seconds, and relax. Repeat 8 to 12 times. Resisted ankle inversion  Sit on the floor with your good leg crossed over your other leg. Hold both ends of an exercise band and loop the band around the inside of your affected foot. Then press your other foot against the band. Keeping your legs crossed, slowly push your affected foot against the band so that foot moves away from your other foot. Then slowly relax. Repeat 8 to 12 times. Resisted ankle eversion  Sit on the floor with your legs straight. Hold both ends of an exercise band and loop the band around the outside of your affected foot. Then press your other foot against the band. Keeping your leg straight, slowly push your affected foot outward against the band and away from your other foot without letting your leg rotate. Then slowly relax. Repeat 8 to 12 times. Resisted ankle dorsiflexion  Tie the ends of an exercise band together to form a loop. Attach one end of the loop to a secure object or shut a door on it to hold it in place. (Or you can have someone hold one end of the loop to provide resistance.)  While sitting on the floor or in a chair, loop the other end of the band over the top of your affected foot. Keeping your knee and leg straight, slowly flex your foot to pull back on the exercise band, and then slowly relax. Repeat 8 to 12 times. Single-leg balance  Stand on a flat surface with your arms stretched out to your sides like you are making the letter \"T. \" Then lift your good leg off the floor, bending it at the knee. If you are not steady on your feet, use one hand to hold on to a chair, counter, or wall. Standing on the leg with your affected ankle, keep that knee straight.  Try to balance on that leg for up to 30 seconds. Then rest for up to 10 seconds. Repeat 6 to 8 times. When you can balance on your affected leg for 30 seconds with your eyes open, try to balance on it with your eyes closed. When you can do this exercise with your eyes closed for 30 seconds and with ease and no pain, try standing on a pillow or piece of foam, and repeat steps 1 through 4. Follow-up care is a key part of your treatment and safety. Be sure to make and go to all appointments, and call your doctor if you are having problems. It's also a good idea to know your test results and keep a list of the medicines you take. Current as of: March 9, 2022               Content Version: 13.4  © 2006-2022 [employer]. Care instructions adapted under license by [employer]. If you have questions about a medical condition or this instruction, always ask your healthcare professional. [place] [address] any warranty or liability for your use of this information.

## 2022-11-01 ENCOUNTER — TELEMEDICINE (OUTPATIENT)
Dept: INTERNAL MEDICINE CLINIC | Age: 47
End: 2022-11-01
Payer: COMMERCIAL

## 2022-11-01 DIAGNOSIS — K58.0 IRRITABLE BOWEL SYNDROME WITH DIARRHEA: ICD-10-CM

## 2022-11-01 DIAGNOSIS — E03.9 ACQUIRED HYPOTHYROIDISM: Primary | ICD-10-CM

## 2022-11-01 DIAGNOSIS — F51.01 PRIMARY INSOMNIA: ICD-10-CM

## 2022-11-01 DIAGNOSIS — L25.9 CONTACT DERMATITIS AND ECZEMA: ICD-10-CM

## 2022-11-01 DIAGNOSIS — F41.9 ANXIETY: ICD-10-CM

## 2022-11-01 PROCEDURE — 99214 OFFICE O/P EST MOD 30 MIN: CPT | Performed by: INTERNAL MEDICINE

## 2022-11-01 RX ORDER — TRAZODONE HYDROCHLORIDE 150 MG/1
150 TABLET ORAL NIGHTLY
Qty: 90 TABLET | Refills: 1 | Status: SHIPPED | OUTPATIENT
Start: 2022-11-01

## 2022-11-01 RX ORDER — LEVOTHYROXINE, LIOTHYRONINE 57; 13.5 UG/1; UG/1
180 TABLET ORAL DAILY
Qty: 180 TABLET | Refills: 1 | Status: SHIPPED | OUTPATIENT
Start: 2022-11-01

## 2022-11-01 RX ORDER — DICYCLOMINE HYDROCHLORIDE 10 MG/1
10 CAPSULE ORAL 2 TIMES DAILY PRN
Qty: 90 CAPSULE | Refills: 0 | Status: SHIPPED | OUTPATIENT
Start: 2022-11-01 | End: 2022-11-02 | Stop reason: SDUPTHER

## 2022-11-01 NOTE — PATIENT INSTRUCTIONS
[facility]:  [place] [phone]  [facility] (8 locations):  [phone] dermatology [phone] [address] [facility] Dermatology [phone]   Dermatology ([facility])  [phone] and [facility] Dermatology - Aruna Morales MD  [facility], [address] [facility], [address]  Ph: [phone]

## 2022-11-01 NOTE — PROGRESS NOTES
Pursuant to the emergency declaration under the [facility] waiver authority and the [employer] Act, this Virtual  Video Visit was conducted, with patient's consent, to reduce the patient's risk of exposure to COVID-19 and provide continuity of care. Service is  provided through a video synchronous discussion virtually to substitute for in-person clinic visit with the patient being at home and Dr. Ariane Palomino being at home. Patient consent to the video visit. Date of Service:  11/1/2022    Chief Complaint:      Chief Complaint   Patient presents with    Anxiety    Insomnia    Thyroid Problem    Irritable Bowel Syndrome       Assessment/Plan:    Josefina Graham was seen today for anxiety, insomnia, thyroid problem and irritable bowel syndrome. Diagnoses and all orders for this visit:    Acquired hypothyroidism  -     NP THYROID 90 MG tablet; Take 2 tablets by mouth daily    Primary insomnia  -     traZODone (DESYREL) 150 MG tablet; Take 1 tablet by mouth nightly    Irritable bowel syndrome with diarrhea  -     dicyclomine (BENTYL) 10 MG capsule; Take 1 capsule by mouth 2 times daily as needed (IBS with diarrhea) 1-2 po qac as needed diarrhea    Anxiety    Contact dermatitis and eczema  -     Richard Adkins MD, Dermatology, [facility] and continue on current medications. Return April 26 at 8:30 Fasting PE.      HPI:  Negrita Hoff is a 50 y.o. She complain of rash on her left face and neck for 2 days. It's been itchy    Insomnia:  Improved but still having a hard time fallen asleep but once she's asleep, she's able to sleep the whole night on Trazodone 100 mg evening. Failed melatonin. IBS with diarrhea:  improve on Bentyl 10 mg as needed when going out 1-2 times a week. She will discuss with GYN regarding progesterone replacement since I'm not sure why she's on it.      Hypothyroidism: Recent symptoms: none. Stable on NP thryoid 90 mg 2 qam.  She denies fatigue, weight gain, weight loss, cold intolerance, heat intolerance, hair loss, dry skin, constipation, diarrhea, edema, anxiety, tremor, palpitations and dysphagia. Patient is  taking her medication consistently on an empty stomach.   Anxiety:  Stable on Zoloft 100 mg daily per GYN    Lab Results   Component Value Date    LABA1C 5.5 05/30/2019    LABMICR YES 05/23/2019     Lab Results   Component Value Date     03/12/2020    K 4.9 03/12/2020     03/12/2020    CO2 26 03/12/2020    BUN 23 03/12/2020    CREATININE 0.8 03/12/2020    GLUCOSE 99 03/12/2020    CALCIUM 9.3 03/12/2020     Lab Results   Component Value Date/Time    CHOL 147 03/12/2020 12:00 AM    TRIG 40 03/12/2020 12:00 AM    HDL 57 03/12/2020 12:00 AM    HDL 44 11/22/2011 10:03 AM    LDLCALC 82 03/12/2020 12:00 AM     Lab Results   Component Value Date    ALT 14 03/12/2020    AST 18 03/12/2020     Lab Results   Component Value Date    TSH 0.033 02/23/2022    TSH 3.88 09/29/2017    T4FREE 0.80 02/23/2022    T4FREE 0.9 09/29/2017     Lab Results   Component Value Date    WBC 4.0 03/12/2020    HGB 14.8 03/12/2020    HCT 42.5 03/12/2020    MCV 91.4 03/12/2020     03/12/2020     No results found for: INR   No results found for: PSA   No results found for: [facility]     Patient Active Problem List   Diagnosis    Anxiety    Irritable bowel syndrome with both constipation and diarrhea    Class 1 obesity due to excess calories without serious comorbidity with body mass index (BMI) of 30.0 to 30.9 in adult    Acquired hypothyroidism    Primary insomnia    Low serum progesterone    Meniere's disease    Other chronic allergic conjunctivitis    Vitamin D insufficiency       No Known Allergies  Outpatient Medications Marked as Taking for the 11/1/22 encounter (Telemedicine) with Beau Closs Ngu, MD   Medication Sig Dispense Refill    traZODone (DESYREL) 150 MG tablet Take 1 tablet by mouth nightly 90 tablet 1    progesterone (PROMETRIUM) 200 MG CAPS capsule Take 600 mg by mouth at bedtime      NP THYROID 90 MG tablet Take 2 tablets by mouth daily 180 tablet 1    dicyclomine (BENTYL) 10 MG capsule 1-2 po qac as needed diarrhea 90 capsule 0    sertraline (ZOLOFT) 100 MG tablet Take 100 mg by mouth daily            Review of Systems: 14 systems were negative except of what was stated on HPI    Nursing note and vitals reviewed. There were no vitals filed for this visit. Wt Readings from Last 3 Encounters:   05/02/22 228 lb (103.4 kg)   06/02/21 210 lb (95.3 kg)   05/05/21 210 lb (95.3 kg)     BP Readings from Last 3 Encounters:   05/02/22 114/66   05/02/21 (!) 141/78   05/30/19 130/74     There is no height or weight on file to calculate BMI. Constitutional: Patient appears well-developed and well-nourished. No distress. Head: Normocephalic and atraumatic. Psychiatric: Normal mood and affect.  Behavior is normal.

## 2022-11-02 ENCOUNTER — TELEPHONE (OUTPATIENT)
Dept: INTERNAL MEDICINE CLINIC | Age: 47
End: 2022-11-02

## 2022-11-02 DIAGNOSIS — K58.0 IRRITABLE BOWEL SYNDROME WITH DIARRHEA: ICD-10-CM

## 2022-11-02 RX ORDER — DICYCLOMINE HYDROCHLORIDE 10 MG/1
10 CAPSULE ORAL 2 TIMES DAILY PRN
Qty: 90 CAPSULE | Refills: 0 | Status: SHIPPED | OUTPATIENT
Start: 2022-11-02

## 2022-11-02 NOTE — TELEPHONE ENCOUNTER
[facility] asking to clarify directions on the dicyclomine 10 mg caps    Take 1 capsule by mouth 2 times daily   as needed (IBS with diarrhea)   1-2 po qac as needed diarrhea

## 2022-12-16 DIAGNOSIS — K58.0 IRRITABLE BOWEL SYNDROME WITH DIARRHEA: ICD-10-CM

## 2022-12-19 RX ORDER — DICYCLOMINE HYDROCHLORIDE 10 MG/1
10 CAPSULE ORAL 2 TIMES DAILY PRN
Qty: 90 CAPSULE | Refills: 0 | OUTPATIENT
Start: 2022-12-19

## 2023-01-16 ENCOUNTER — OFFICE VISIT (OUTPATIENT)
Dept: DERMATOLOGY | Age: 48
End: 2023-01-16
Payer: COMMERCIAL

## 2023-01-16 ENCOUNTER — OFFICE VISIT (OUTPATIENT)
Dept: INTERNAL MEDICINE CLINIC | Age: 48
End: 2023-01-16
Payer: COMMERCIAL

## 2023-01-16 VITALS
SYSTOLIC BLOOD PRESSURE: 104 MMHG | WEIGHT: 223 LBS | HEIGHT: 72 IN | HEART RATE: 105 BPM | OXYGEN SATURATION: 97 % | BODY MASS INDEX: 30.2 KG/M2 | DIASTOLIC BLOOD PRESSURE: 70 MMHG

## 2023-01-16 DIAGNOSIS — E03.9 ACQUIRED HYPOTHYROIDISM: ICD-10-CM

## 2023-01-16 DIAGNOSIS — L30.8 OTHER ECZEMA: Primary | ICD-10-CM

## 2023-01-16 DIAGNOSIS — N81.11 CYSTOCELE, MIDLINE: Primary | ICD-10-CM

## 2023-01-16 DIAGNOSIS — N81.6 RECTOCELE: ICD-10-CM

## 2023-01-16 DIAGNOSIS — Z01.818 PREOP EXAM FOR INTERNAL MEDICINE: ICD-10-CM

## 2023-01-16 DIAGNOSIS — Z00.00 ANNUAL PHYSICAL EXAM: ICD-10-CM

## 2023-01-16 DIAGNOSIS — N81.5 VAGINAL ENTEROCELE: ICD-10-CM

## 2023-01-16 LAB
BASOPHILS ABSOLUTE: 0 K/UL (ref 0–0.2)
BASOPHILS RELATIVE PERCENT: 0.6 %
EOSINOPHILS ABSOLUTE: 0.1 K/UL (ref 0–0.6)
EOSINOPHILS RELATIVE PERCENT: 1.3 %
HCT VFR BLD CALC: 42.6 % (ref 36–48)
HEMOGLOBIN: 14.1 G/DL (ref 12–16)
LYMPHOCYTES ABSOLUTE: 1.5 K/UL (ref 1–5.1)
LYMPHOCYTES RELATIVE PERCENT: 26.8 %
MCH RBC QN AUTO: 30 PG (ref 26–34)
MCHC RBC AUTO-ENTMCNC: 33.1 G/DL (ref 31–36)
MCV RBC AUTO: 90.7 FL (ref 80–100)
MONOCYTES ABSOLUTE: 0.5 K/UL (ref 0–1.3)
MONOCYTES RELATIVE PERCENT: 9.2 %
NEUTROPHILS ABSOLUTE: 3.6 K/UL (ref 1.7–7.7)
NEUTROPHILS RELATIVE PERCENT: 62.1 %
PDW BLD-RTO: 13.8 % (ref 12.4–15.4)
PLATELET # BLD: 262 K/UL (ref 135–450)
PMV BLD AUTO: 7.3 FL (ref 5–10.5)
RBC # BLD: 4.7 M/UL (ref 4–5.2)
WBC # BLD: 5.7 K/UL (ref 4–11)

## 2023-01-16 PROCEDURE — 99214 OFFICE O/P EST MOD 30 MIN: CPT | Performed by: INTERNAL MEDICINE

## 2023-01-16 PROCEDURE — 99204 OFFICE O/P NEW MOD 45 MIN: CPT | Performed by: INTERNAL MEDICINE

## 2023-01-16 ASSESSMENT — PATIENT HEALTH QUESTIONNAIRE - PHQ9
SUM OF ALL RESPONSES TO PHQ QUESTIONS 1-9: 0
2. FEELING DOWN, DEPRESSED OR HOPELESS: 0
SUM OF ALL RESPONSES TO PHQ QUESTIONS 1-9: 0
1. LITTLE INTEREST OR PLEASURE IN DOING THINGS: 0
SUM OF ALL RESPONSES TO PHQ9 QUESTIONS 1 & 2: 0

## 2023-01-16 NOTE — PROGRESS NOTES
PREOPERATIVE CONSULTATION      Rosa Oswald  YOB: 1975    Date of Service:  1/16/2023    Chief Complaint   Patient presents with    Pre-op Exam     01/25/2023- ANTERIOR, POSTERIOR, ENTEROCELE REPAIR, CYSTOSCOPY- Dr. Torres  Fax- [phone]         Assessment/Plan:    47 y.o. patient with planned surgery as above.    SURGERY SPECIFIC RISK:  none  Known risk factors for perioperative complications: None  Current medications which may produce withdrawal symptoms if withheld perioperatively: none     1. Preoperative workup as follows: CBC  2. Change in medication regimen before surgery: None  3. Prophylaxis for cardiac events with perioperative beta-blockers: Not indicated  ACC/AHA indications for pre-operative beta-blocker use:    Vascular surgery with history of postitive stress test  Intermediate or high risk surgery with history of CAD   Intermediate or high risk surgery with multiple clinical predictors of CAD- 2 of the following: history of compensated or prior heart failure, history of cerebrovascular disease, DM, or renal insufficiency  Routine administration of higher-dose, long-acting metoprolol in beta-blocker-naïve patients on the day of surgery, and in the absence of dose titration is associated with an overall increase in mortality.  Beta-blockers should be started days to weeks prior to surgery and titrated to pulse < 70.  4. Deep vein thrombosis prophylaxis: regimen to be chosen by surgical team     Rosa was seen today for pre-op exam.    Diagnoses and all orders for this visit:    Cystocele, midline    Rectocele    Vaginal enterocele    Preop exam for internal medicine    Acquired hypothyroidism  -     TSH    Annual physical exam  -     Lipid Panel  -     Comprehensive Metabolic Panel  -     CBC with Auto Differential    Stable and continue on current medications.      No contraindications to planned surgery        HPI:  Rosa Oswald is a 47 y.o.    This patient presents to the  office today for a preoperative consultation at the request of surgeon, Dr. Mayi Queen, who plans on performing ANTERIOR, POSTERIOR, ENTEROCELE REPAIR, CYSTOSCOPY on January 25 at [facility]. The current problem began 6 months ago, and symptoms have been worsening with time. Conservative therapy: No.         Hypothyroidism: Recent symptoms: none. Stable on NP thryoid 90 mg 2 qam.  She denies fatigue, weight gain, weight loss, cold intolerance, heat intolerance, hair loss, dry skin, constipation, diarrhea, edema, anxiety, tremor, palpitations and dysphagia. Patient is  taking her medication consistently on an empty stomach. Anxiety:  Stable on Zoloft 100 mg daily per GYN  Insomnia:  stable on Trazodone 100 mg evening. Failed melatonin. IBS with diarrhea:  improve on Bentyl 10 mg as needed when going out 1-2 times a week. She will discuss with GYN regarding progesterone replacement since I'm not sure why she's on it.     Planned anesthesia: General   Known anesthesia problems: None   Bleeding risk: No recent or remote history of abnormal bleeding  Personal or FH of DVT/PE: No    Patient objection to receiving blood products: No    No Known Allergies  Outpatient Medications Marked as Taking for the 1/16/23 encounter (Office Visit) with Karla Hsieh MD   Medication Sig Dispense Refill    dicyclomine (BENTYL) 10 MG capsule Take 1 capsule by mouth 2 times daily as needed (IBS with diarrhea) 90 capsule 0    traZODone (DESYREL) 150 MG tablet Take 1 tablet by mouth nightly 90 tablet 1    NP THYROID 90 MG tablet Take 2 tablets by mouth daily 180 tablet 1    progesterone (PROMETRIUM) 200 MG CAPS capsule Take 600 mg by mouth at bedtime      sertraline (ZOLOFT) 100 MG tablet Take 100 mg by mouth daily          Patient Active Problem List   Diagnosis    Anxiety    Irritable bowel syndrome with both constipation and diarrhea    Class 1 obesity due to excess calories without serious comorbidity with body mass index (BMI) of 30.0 to 30.9 in adult    Acquired hypothyroidism    Primary insomnia    Low serum progesterone    Meniere's disease    Other chronic allergic conjunctivitis    Vitamin D insufficiency       Past Medical History:   Diagnosis Date    Anxiety     Benign essential microscopic hematuria 2019    Closed fracture of middle or proximal phalanx or phalanges of hand 2011    Gamekeeper thumb     IBS (irritable bowel syndrome)     Meniere disease     Pain in limb 2011    Spontaneous vaginal delivery         Status post cholecystectomy 2019    Varicella      Past Surgical History:   Procedure Laterality Date    BREAST ENHANCEMENT SURGERY      BREAST ENHANCEMENT SURGERY      CHOLECYSTECTOMY  2006    Laparoscopy    EYE SURGERY Bilateral 1978    strabismus     Family History   Problem Relation Age of Onset    High Cholesterol Mother     No Known Problems Father     Lung Cancer Maternal Grandmother     Colon Cancer Maternal Grandfather     Other Daughter         global apraxia, childhood apraxia of speech    Osteoporosis Sister     Ovarian Cancer Paternal Aunt      Social History     Socioeconomic History    Marital status:      Spouse name: Not on file    Number of children: Not on file    Years of education: Not on file    Highest education level: Not on file   Occupational History    Not on file   Tobacco Use    Smoking status: Never    Smokeless tobacco: Never   Vaping Use    Vaping Use: Never used   Substance and Sexual Activity    Alcohol use: No    Drug use: No    Sexual activity: Yes     Partners: Male     Comment: husb = Alex [place]   Other Topics Concern    Not on file   Social History Narrative    Not on file     Social Determinants of Health     Financial Resource Strain: Low Risk     Difficulty of Paying Living Expenses: Not hard at all   Food Insecurity: No Food Insecurity    Worried About Running Out of Food in the Last Year: Never true    [address] in the Last Year: Never true   Transportation Needs: Not on file   Physical Activity: Not on file   Stress: Not on file   Social Connections: Not on file   Intimate Partner Violence: Not on file   Housing Stability: Not on file       Review of Systems  A comprehensive review of systems was negative except for what was noted in the HPI. Physical Exam   Vitals:    01/16/23 1059   BP: 104/70   Pulse: (!) 105   SpO2: 97%   Weight: 223 lb (101.2 kg)   Height: 6' (1.829 m)     Wt Readings from Last 2 Encounters:   01/16/23 223 lb (101.2 kg)   05/02/22 228 lb (103.4 kg)     BP Readings from Last 3 Encounters:   01/16/23 104/70   05/02/22 114/66   05/02/21 (!) 141/78     Body mass index is 30.24 kg/m². Constitutional: She is oriented to person, place, and time. She appears well-developed and well-nourished. No distress. HENT:   Head: Normocephalic and atraumatic. Mouth/Throat: Uvula is midline, oropharynx is clear and moist and mucous membranes are normal.   Eyes: Conjunctivae and EOM are normal. Pupils are equal, round, and reactive to light. Neck: Trachea normal and normal range of motion. Neck supple. No JVD present. Carotid bruit is not present. No mass and no thyromegaly present. Cardiovascular: Normal rate, regular rhythm, normal heart sounds and intact distal pulses. Exam reveals no gallop and no friction rub. No murmur heard. Pulmonary/Chest: Effort normal and breath sounds normal. No respiratory distress. She has no wheezes. She has no rales. Abdominal: Soft. Normal aorta and bowel sounds are normal. She exhibits no distension and no mass. There is no hepatosplenomegaly. No tenderness. Musculoskeletal: She exhibits no edema and no tenderness. Neurological: She is alert and oriented to person, place, and time. She has normal strength. No cranial nerve deficit or sensory deficit. Coordination and gait normal.   Skin: Skin is warm and dry. No rash noted. No erythema.      Lab Results   Component Value Date LABA1C 5.5 05/30/2019    LABMICR YES 05/23/2019     Lab Results   Component Value Date     03/12/2020    K 4.9 03/12/2020     03/12/2020    CO2 26 03/12/2020    BUN 23 03/12/2020    CREATININE 0.8 03/12/2020    GLUCOSE 99 03/12/2020    CALCIUM 9.3 03/12/2020     Lab Results   Component Value Date/Time    CHOL 147 03/12/2020 12:00 AM    TRIG 40 03/12/2020 12:00 AM    HDL 57 03/12/2020 12:00 AM    HDL 44 11/22/2011 10:03 AM    LDLCALC 82 03/12/2020 12:00 AM     Lab Results   Component Value Date    ALT 14 03/12/2020    AST 18 03/12/2020     Lab Results   Component Value Date    TSH 0.033 02/23/2022    TSH 3.88 09/29/2017    T4FREE 0.80 02/23/2022    T4FREE 0.9 09/29/2017     Lab Results   Component Value Date    WBC 4.0 03/12/2020    HGB 14.8 03/12/2020    HCT 42.5 03/12/2020    MCV 91.4 03/12/2020     03/12/2020     No results found for: INR   No results found for: PSA   No results found for: LABURIC       No contraindications to planned surgery      . Mary Hollins M.D.   [facility]  Office: [phone]  Fax: [phone]

## 2023-01-16 NOTE — PROGRESS NOTES
[facility]  Chetan Rodríguez MD  [phone]    Date of Visit: 2023    Cony Dupont is a 50 y.o. female who presents for rash. New pt    Chief Complaint:   Chief Complaint   Patient presents with    Dermatitis     Had since  and goes        History of Present Illness:    Concern:  L arm rash-->legs, torso, neck ,arm, hands  Duration:  Since 2022  Symptoms: Itchy with flares  Previous treatments:    -Oral steroid pills  -Topical steroid ointment  Effect of current treatment: Not controlled, flares twice a week or so    *Personal history of skin cancer: None  *Family history of skin cancer: None     Review of Systems:  Gen: Feels well, good sense of health. Skin: No new or changing moles, no history of keloids or hypertrophic scars. Past Medical History, Family History, Surgical History, Medications and Allergies reviewed. Past Medical History:   Diagnosis Date    Anxiety     Benign essential microscopic hematuria 2019    Closed fracture of middle or proximal phalanx or phalanges of hand 2011    Gamekeeper thumb     IBS (irritable bowel syndrome)     Meniere disease     Pain in limb 2011    Spontaneous vaginal delivery         Status post cholecystectomy 2019    Varicella      Past Surgical History:   Procedure Laterality Date    BREAST ENHANCEMENT SURGERY      BREAST ENHANCEMENT SURGERY      CHOLECYSTECTOMY  2006    Laparoscopy    EYE SURGERY Bilateral     strabismus       No Known Allergies  Outpatient Medications Marked as Taking for the 23 encounter (Office Visit) with Tai Lui MD   Medication Sig Dispense Refill    triamcinolone (KENALOG) 0.1 % ointment Apply to affected rash twice daily for up to 2 weeks per month or until improved.  80 g 2    dicyclomine (BENTYL) 10 MG capsule Take 1 capsule by mouth 2 times daily as needed (IBS with diarrhea) 90 capsule 0    traZODone (DESYREL) 150 MG tablet Take 1 tablet by mouth nightly 90 tablet 1    NP THYROID 90 MG tablet Take 2 tablets by mouth daily 180 tablet 1    progesterone (PROMETRIUM) 200 MG CAPS capsule Take 600 mg by mouth at bedtime      sertraline (ZOLOFT) 100 MG tablet Take 100 mg by mouth daily            Physical Examination   No acute distress. Mood clear/affect appropriate. Alert and oriented. Mucous membranes moist.  Sclera anicteric. Visible skin exam was conducted to include the scalp, face, lips/teeth, lids/conjunctiva, ears, neck, right and left hands and forearms and was normal with the following exceptions:   -Pink eczematous papules on L flank  -No other rash today    Photos from prior show eczematous plaque with vesicles on forearm at initial onset. Other smaller pink plaques on arms, legs       Assessment and Plan     1. Other eczema, diffuse body involvement--not controlled  -Favor ACD. Reviewed etiology, prognosis, chronic nature of condition  -Start:  - triamcinolone (KENALOG) 0.1 % ointment; Apply to affected rash twice daily for up to 2 weeks per month or until improved. Dispense: 80 g; Refill: 2   -Strict bland skin care for 6 weeks    Future considerations: Patch testing     Return in about 8 weeks (around 3/13/2023). Note is transcribed using voice recognition software. Inadvertent computerized transcription errors may be present.     Cassie Martínez MD

## 2023-01-16 NOTE — PATIENT INSTRUCTIONS
Thank you for visiting [facility] today! Please follow the instructions below as we discussed in clinic:      Start triamcinolone ointment twice a day to itchy rash areas for up to 2 weeks per month    Medications: Corticosteroid; Generic  Fougera   Betamethasone Valerate Lotion (0.1%)   Betamethasone Valerate Lotion (0.1%)   Triamcinolone Acetonide Ointment (0.025% and 0.1%)   Clobetasol Propionate Topical Solution (0.05%)   Betamethasone Dipropionate Ointment (0.05%)   Betamethasone Dipropionate Lotion (0.05%)      Perrigo   Mometasone Furoate Topical Solution (0.1%)   Desonide Ointment [0.05%]   Triamcinolone Acetonide Ointment (0.025%, 0.1% and 0.5%)   Betamethasone Dipropionate Lotion    Fluocinolone Acetonide Topical Scalp Oil, 0.01%      Taro   Desoximetasone Ointment (0.05%)   Hydrocortisone Butyrate Cream (0.1%)   Hydrocortisone Butyrate Ointment (0.1%)   Hydrocortisone Butyrate Solution (0.1%)   Desoximetasone Gel [0.05%]   Desoximetasone Ointment [0.25%]   Desonide Ointment (0.05%)   Oralone Triamcinolone Acetonide Dental Paste (0.1%)   Triamcinolone Acetonide Ointment (0.1%)   Triamcinolone Acetonide Dental Paste [0.1%]   Clobetasol Propionate Topical Solution       Teva   Fluocinonide Ointment [0.05]   Beta-Sheridan Lotion (0.1%)     Medications: Miscellaneous, Rx  Fougera  Tacrolimus 0.03% Ointment      Perrigo  Tacrolimus Ointment 0.03%      Protopic  0.03% or 0.1% Ointment     Antiperspirants\Deodorants  Alaway  Deoderant      Certain Dri  Prescription Strength Clinical Roll-On Antiperspirant      Cleure   Roll-On or Spray Deodorant, Aluminum Free   Stick Deodorant, Crystal      Crystal   Body Deodorant Stick   Roll-On Body Deodorant, Unscented      Kiss My Face Liquid Rock Roll on Deodorant, Fragrance Free      Helnea  Helena For Men Advanced Invisible Roll-On Antiperspirant & Deodorant, Unscented      Sure  Antiperspirant & Deodorant Aerosol, Unscented      Vanicream  Antiperspirant/Deodorant   Deodorant     Hair Care: Conditioners  Cleure  Replenishing Conditioner      DHS   DHS Conditioning Rinse With Panthenol   DHS Color Safe Rinse With Panthenol      No-Nothing  Fragrance Free Very Sensitive Moisture Conditioner      TrueCider  Creamy Conditioner      VMV Hypoallergenics  Essence Skin-Saving Milk Conditioner     Hair Care: Shampoos  Betzaida's Tallow Treasures  Unscented Shampoo Soap      Cleure  Shampoo, Hydrating & Volumizing      CLn   Cln Healthy Scalp Shampoo   Moisture Rich Gentle Shampoo    Cln 2-in-1 Gentle Wash and Shampoo      [place] Garden Soaps  Fragrance Free Solid Shampoo Bar      J.R. Aldair's  Moisturizing Formula Shampoo Bar      [place]  Shampoo Bar, Avocado Oil, Fragrance-Free      Skinny & Co.  Clarifying Raw Shampoo Bar      The Soap Opera  Beekman Goat Milk Fragrance Free Shampoo Bar      TrueCider  True Cider Shampoo and Body Wash      VMV Hypoallergenics   Essence Skin-Saving Jonatan Wash Hair + Body \"Big Softie\" Shampoo   Essence Skin-Saving Superwash Hair + Body Milk Shampoo      Whiff   Unscented Shampoo Bar     Hair Care: Shampoos, Dry  Dove  Care Between Washes Unscented Dry Shampoo      No-Nothing  Sensitive Dry Shampoo, Fragrance Free      Not Your Mother's  Clean Freak Unscented Dry Shampoo     Hair Care: Stylers and Treatments  Biolage by Matrix  R.A.W.  Texturizing Styling Hair Spray (d)      Cleure   Hair Styling Gel, Medium Hold   Hair Spray, Firm Hold      Clinique  Hair Care Non-Aerosol Hairspray, Gentle Hold      Free & Clear  Styling and Finishing Hair Spray, Soft Hold (d)      No-Nothing   Fragrance Free Very Sensitive And Super Strong Hairspray   Fragrance Free Very Sensitive And Strong Hairspray      Vanicream   Hair Gel, For Sensitive Skin   Hair Spray, Firm Hold     Skin Care: Hand   Babyganics  Alcohol-Free Foaming Hand , Fragrance Free      Kiss My Face  Moisturizing Hand  with Alcohol      La Roche-Posay   Gel Hydro-Alcoolique Purifying Hand Gel   Alcohol Antiseptic Hand       VMV Hypoallergenics  Grandma Elizabeth's Kid Gloves Monolaurin Moisturizing \"Make-It-\" Hand Gel      Whole Foods 365  Refreshing Gel Hand , Fragrance Free     Skin Care: Moisturizers  AmLactin   Daily Moisturizing Body Lotion   Rapid Relief Restoring Lotion      Aveeno   Baby Eczema Therapy Moisturizing Cream   Eczema Therapy Daily Moisturizing Cream   Eczema Therapy Hand And Face Cream (d)   Daily Moisturizing Sheer Hydration Lotion      Medtronic Sleep Potion Night Cream      CeraVe   Moisturizing Cream   Daily Moisturizing Lotion   P.M.  Facial Moisturizing Lotion   SA Lotion For Rough Bumpy Skin   Therapeutic Hand Cream   SA Cream For Rough Bumpy Skin   Healing Ointment   Baby Moisturizing Lotion   Baby Moisturizing Cream   Psoriasis Moisturizing Cream   Baby Healing Ointment      Cetaphil   Daily Hydrating Lotion with Hyaluronic Acid   Rich Hydrating Cream   Intensive Moisturizing Cream   Cracked Skin Repair Lotion      Cleure  Oil Free Facial Lotion for Sensitive Skin      Dove   DermaSeries Eczema Relief Body Lotion   Baby Dove Sensitive Moisture Lotion   DermaSeries Dry Skin Relief Body Cream      Elta MD   Moisturizer, Intense Moisturizer   Face, Hands & Body Lotion   AM Therapy Facial Moisturizer   So Silky Hand Crème      Lubriderm   Advanced Therapy Fragrance-Free Lotion   Advanced Therapy Fragrance-Free Lotion   Daily Moisture Lotion, Fragrance Free      Neutrogena  [place] Formula Hand Cream, Fragrance Free      Baca's  Coconut Oil Formula Body Oil      TrueCider  Face & Body Serum      Vanicream   Daily Facial Moisturizer For Sensitive Skin   Moisturizing Ointment      Vaseline   Intensive Care Advanced Repair Unscented Lotion   Vaseline Original Healing Jelly      VMV Hypoallergenics   Grandma Elizabeth's The Michell maynor, [place] Martin-Martin Kempner   Grandma Elizabeth's Mommycoddling All-Over Lotion   Red Better Calm-The-Heck-Down Hartleton Steroid-Free Salve   Essence Hand + Body Smoother Lotion     Facial Moisturizers with SPF  Aveeno  Daily Moisturizing Lotion With Sunscreen SPF 15      CeraVe   A.M.  Facial Moisturizing Lotion SPF 30   Skin Renewing Day Cream SPF 30      Cetaphil  DermaControl Oil Absorbing Moisturizer SPF 30      Dove  DermaSeries Dry Skin Relief Face Cream SPF 15      Eucerin  Redness Relief Day Lotion Broad Spectrum SPF 15      La Roche-Posay  Toleriane Double Repair Face Moisturizer SPF 30      Neutrogena  Hydro Boost Hyaluronic Acid Moisturizer SPF 50      Olay   Regenerist Hydrating Moisturizer with Mineral SPF 15 (Fragrance Free)   Regenerist Hydrating Moisturizer with Mineral SPF 30 (Fragrance Free)     Skin Care: Soaps\Cleansers  AB  Skincare Cleanser      CeraVe   Hydrating Facial Cleanser   Eczema Body Wash (d)   Soothing Body Wash      Cetaphil  Gentle Skin Cleanser      Cleure   Glycerine Face/Body SLS Free Bar, Unscented   Pure Clarifying Face & Body The First American   All About Clean Foaming Facial Soap   All About Clean Rinse-Off Foaming Cleanser      CLn  Moisture Balancing Facial Cleanser      Dove  DermaSeries Dry Skin Relief Face Wash      Free & Clear  Liquid Cleanser      Kiss My Face  Olive Oil Bar Soap, Fragrance Free      Neutrogena  Transparent Facial Bar Fragrance-Free      Anish's of [facility]  Fragrance Free Sensitive Beauty Bar with Aloe Vera (      TrueCider  Gentle Creamy Cleanser      VMV Hypoallergenics  Moisture Rich Creammmy Cleansing Milk For Dry Skin       Skin Care: Sunscreens  Banana Boat   Kids Tear-Free Sunscreen Lotion SPF 50 (d)   Simply Protect Baby Tear-Free Mineral-Based Sunscreen Lotion SPF 50+ (d)   Sport Mineral Enriched Sunscreen Spray SPF 50+      Cleure  Sunscreen SPF 30 for Sensitive Skin      [address] Broad Spectrum SPF 50 Daily Energy + Face Protector      Coppertone  Kids Tear Free Sunscreen Lotion SPF 48      Elta MD   UV AERO Full-Body Sunscreen spray SPF 45   UV Elements Tinted Facial Sunscreen Broad Spectrum SPF 44   UV Replenish Facial Sunscreen Broad Spectrum SPF 40      Solbar  Thirty SPF 30     Household Products: Dishwashing  7th Generation   Powerful Clean  Detergent Pacs, Free & Clear   Powerful Clean  Detergent Powder, Free & Clear      Biokleen  Free & Clear Automatic [facility] Pods      Whole Foods 365   Detergent, Packs, unscented     Household Products: Laundry Detergents  7th Generation  Laundry Detergent Packs, Free & Clear      All  Free Clear Detergent Powder      Dreft  Family Freindly Liquid Detergent, Unscented      Tide  Ultra Tide Free & Gentle Powdered Detergent      Whole Foods   Organic Laundry Detergent, Unscented   Organic Baby Laundry Detergent Liquid, Unscented      Whole Foods 365   Powdered Laundry Detergent, Unscented   Baby Laundry Detergent Liquid, unscented     Household Products: Laundry Fabric Softener\Bleach\Additives  7th Generation   Chlorine Free HASKAYNE, Free & Clear   Fabric Softener Sheets, Free & Clear      Biokleen  Fragrance Free Dryer Sheets      Clorox  Gentle Free & Clear Bleach      Whole Foods 365  Fabric Softening Dryer Sheets, unscented     Shaving  Avelino   White 3 Hydrating Shave Gel Razor Cartridges   Hydro 5 Hydrating Shave Gel Razor Cartridges      Vanicream  Shave Cream for Sensitive Skin      VMV Hypoallergenics   1635 Smoothening Aftershave Solution   1635 Pre-Shave Ravi Oil For All Skin Types     Wipes  Pampers   Sensitive Baby Wipes   Aqua Pure Wipes

## 2023-01-17 LAB
A/G RATIO: 1.6 (ref 1.1–2.2)
ALBUMIN SERPL-MCNC: 4.2 G/DL (ref 3.4–5)
ALP BLD-CCNC: 116 U/L (ref 40–129)
ALT SERPL-CCNC: 13 U/L (ref 10–40)
ANION GAP SERPL CALCULATED.3IONS-SCNC: 11 MMOL/L (ref 3–16)
AST SERPL-CCNC: 16 U/L (ref 15–37)
BILIRUB SERPL-MCNC: 0.3 MG/DL (ref 0–1)
BUN BLDV-MCNC: 16 MG/DL (ref 7–20)
CALCIUM SERPL-MCNC: 9.3 MG/DL (ref 8.3–10.6)
CHLORIDE BLD-SCNC: 104 MMOL/L (ref 99–110)
CHOLESTEROL, TOTAL: 159 MG/DL (ref 0–199)
CO2: 26 MMOL/L (ref 21–32)
CREAT SERPL-MCNC: 0.8 MG/DL (ref 0.6–1.1)
GFR SERPL CREATININE-BSD FRML MDRD: >60 ML/MIN/{1.73_M2}
GLUCOSE BLD-MCNC: 95 MG/DL (ref 70–99)
HDLC SERPL-MCNC: 46 MG/DL (ref 40–60)
LDL CHOLESTEROL CALCULATED: 92 MG/DL
POTASSIUM SERPL-SCNC: 4.6 MMOL/L (ref 3.5–5.1)
SODIUM BLD-SCNC: 141 MMOL/L (ref 136–145)
TOTAL PROTEIN: 6.8 G/DL (ref 6.4–8.2)
TRIGL SERPL-MCNC: 107 MG/DL (ref 0–150)
TSH SERPL DL<=0.05 MIU/L-ACNC: 0.03 UIU/ML (ref 0.27–4.2)
VLDLC SERPL CALC-MCNC: 21 MG/DL

## 2023-04-26 ENCOUNTER — OFFICE VISIT (OUTPATIENT)
Dept: INTERNAL MEDICINE CLINIC | Age: 48
End: 2023-04-26
Payer: COMMERCIAL

## 2023-04-26 VITALS
WEIGHT: 223 LBS | OXYGEN SATURATION: 97 % | HEART RATE: 99 BPM | SYSTOLIC BLOOD PRESSURE: 100 MMHG | BODY MASS INDEX: 30.2 KG/M2 | HEIGHT: 72 IN | DIASTOLIC BLOOD PRESSURE: 64 MMHG

## 2023-04-26 DIAGNOSIS — Z00.00 ENCOUNTER FOR WELL ADULT EXAM WITHOUT ABNORMAL FINDINGS: Primary | ICD-10-CM

## 2023-04-26 DIAGNOSIS — K58.0 IRRITABLE BOWEL SYNDROME WITH DIARRHEA: ICD-10-CM

## 2023-04-26 DIAGNOSIS — E03.9 ACQUIRED HYPOTHYROIDISM: ICD-10-CM

## 2023-04-26 DIAGNOSIS — F51.01 PRIMARY INSOMNIA: ICD-10-CM

## 2023-04-26 PROCEDURE — 99396 PREV VISIT EST AGE 40-64: CPT | Performed by: INTERNAL MEDICINE

## 2023-04-26 RX ORDER — TRAZODONE HYDROCHLORIDE 150 MG/1
150 TABLET ORAL NIGHTLY
Qty: 90 TABLET | Refills: 3 | Status: SHIPPED | OUTPATIENT
Start: 2023-04-26

## 2023-04-26 RX ORDER — LEVOTHYROXINE, LIOTHYRONINE 57; 13.5 UG/1; UG/1
180 TABLET ORAL DAILY
Qty: 180 TABLET | Refills: 1 | Status: SHIPPED | OUTPATIENT
Start: 2023-04-26

## 2023-04-26 RX ORDER — DICYCLOMINE HYDROCHLORIDE 10 MG/1
10 CAPSULE ORAL 2 TIMES DAILY PRN
Qty: 90 CAPSULE | Refills: 0 | Status: SHIPPED | OUTPATIENT
Start: 2023-04-26

## 2023-04-26 SDOH — ECONOMIC STABILITY: FOOD INSECURITY: WITHIN THE PAST 12 MONTHS, YOU WORRIED THAT YOUR FOOD WOULD RUN OUT BEFORE YOU GOT MONEY TO BUY MORE.: NEVER TRUE

## 2023-04-26 SDOH — ECONOMIC STABILITY: HOUSING INSECURITY
IN THE LAST 12 MONTHS, WAS THERE A TIME WHEN YOU DID NOT HAVE A STEADY PLACE TO SLEEP OR SLEPT IN A SHELTER (INCLUDING NOW)?: NO

## 2023-04-26 SDOH — ECONOMIC STABILITY: FOOD INSECURITY: WITHIN THE PAST 12 MONTHS, THE FOOD YOU BOUGHT JUST DIDN'T LAST AND YOU DIDN'T HAVE MONEY TO GET MORE.: NEVER TRUE

## 2023-04-26 SDOH — ECONOMIC STABILITY: INCOME INSECURITY: HOW HARD IS IT FOR YOU TO PAY FOR THE VERY BASICS LIKE FOOD, HOUSING, MEDICAL CARE, AND HEATING?: NOT HARD AT ALL

## 2023-04-26 NOTE — PROGRESS NOTES
[facility]  History and Physical  Olivia Estevez M.D.        Giorgi Steel  YOB: 1975    Date of Service:  4/26/2023    Chief Complaint:   Giorgi Steel is a 50 y.o. female who presents for   Chief Complaint   Patient presents with    Annual Exam       Assessment/Plan:    Za Hazel was seen today for annual exam.    Diagnoses and all orders for this visit:    Encounter for well adult exam without abnormal findings    Irritable bowel syndrome with diarrhea  -     dicyclomine (BENTYL) 10 MG capsule; Take 1 capsule by mouth 2 times daily as needed (IBS with diarrhea)    Primary insomnia  -     traZODone (DESYREL) 150 MG tablet; Take 1 tablet by mouth nightly    Acquired hypothyroidism  -     NP THYROID 90 MG tablet; Take 2 tablets by mouth daily        Return VV Thyroid, Sleep, IBS 10/24. HPI: Here for Annual Physical and Follow up. Insomnia:  Improved but still having a hard time fallen asleep but once she's asleep, she's able to sleep the whole night on Trazodone 100 mg evening. Failed melatonin. IBS with diarrhea:  stable on Bentyl 10 mg as needed when going out 1-2 times a week. Hypothyroidism: Recent symptoms: none. Stable on NP thryoid 90 mg 2 qam.  She denies fatigue, weight gain, weight loss, cold intolerance, heat intolerance, hair loss, dry skin, constipation, diarrhea, edema, anxiety, tremor, palpitations and dysphagia. Patient is  taking her medication consistently on an empty stomach.   Anxiety:  Stable on Zoloft 100 mg daily per GYN    Lab Results   Component Value Date    LABA1C 5.5 05/30/2019    LABMICR YES 05/23/2019     Lab Results   Component Value Date     01/16/2023    K 4.6 01/16/2023     01/16/2023    CO2 26 01/16/2023    BUN 16 01/16/2023    CREATININE 0.8 01/16/2023    GLUCOSE 95 01/16/2023    CALCIUM 9.3 01/16/2023     Lab Results   Component Value Date/Time    CHOL 159 01/16/2023 11:18 AM    TRIG 107 01/16/2023 11:18 AM    HDL 46

## 2023-06-03 DIAGNOSIS — K58.0 IRRITABLE BOWEL SYNDROME WITH DIARRHEA: ICD-10-CM

## 2023-06-05 RX ORDER — DICYCLOMINE HYDROCHLORIDE 10 MG/1
10 CAPSULE ORAL 2 TIMES DAILY PRN
Qty: 90 CAPSULE | Refills: 0 | Status: SHIPPED | OUTPATIENT
Start: 2023-06-05

## 2023-06-19 ENCOUNTER — OFFICE VISIT (OUTPATIENT)
Dept: INTERNAL MEDICINE CLINIC | Age: 48
End: 2023-06-19
Payer: COMMERCIAL

## 2023-06-19 VITALS
WEIGHT: 227 LBS | SYSTOLIC BLOOD PRESSURE: 120 MMHG | BODY MASS INDEX: 30.75 KG/M2 | OXYGEN SATURATION: 97 % | HEART RATE: 89 BPM | DIASTOLIC BLOOD PRESSURE: 78 MMHG | HEIGHT: 72 IN

## 2023-06-19 DIAGNOSIS — L25.9 CONTACT DERMATITIS AND ECZEMA: Primary | ICD-10-CM

## 2023-06-19 PROCEDURE — 99213 OFFICE O/P EST LOW 20 MIN: CPT | Performed by: INTERNAL MEDICINE

## 2023-06-19 RX ORDER — PREDNISONE 20 MG/1
TABLET ORAL
Qty: 15 TABLET | Refills: 0 | Status: SHIPPED | OUTPATIENT
Start: 2023-06-19 | End: 2023-06-29

## 2023-06-19 NOTE — PROGRESS NOTES
Rosa Oswald  YOB: 1975    Date of Service:  6/19/2023    Chief Complaint:      Chief Complaint   Patient presents with    Rash     Chest, arms, and knees. Off and on x 1 year          Assessment/Plan:  Nafisa Taylor was seen today for rash. Diagnoses and all orders for this visit:    Contact dermatitis and eczema  -     predniSONE (DELTASONE) 20 MG tablet; Take 3 pills in the AM for 1 day, 2 pills in AM for 5 days, then 1 pill in AM for 2 days      Return if symptoms worsen or fail to improve. HPI:  Gladys Borja is a 46 y.o. She complain of a rash that started a week ago in her right forearm and now it's on front of her chest and spot on her left knee although she never wear shorts. She's not been outside weeding. No changes in laundry detergent, dish soap, shampoo, cosmetics,...  she has an apartment with a Dermatologist and an [address] [place] [address] in August.  Taking some over the counter allergy pill.     Lab Results   Component Value Date    LABA1C 5.5 05/30/2019    LABMICR YES 05/23/2019     Lab Results   Component Value Date     01/16/2023    K 4.6 01/16/2023     01/16/2023    CO2 26 01/16/2023    BUN 16 01/16/2023    CREATININE 0.8 01/16/2023    GLUCOSE 95 01/16/2023    CALCIUM 9.3 01/16/2023     Lab Results   Component Value Date/Time    CHOL 159 01/16/2023 11:18 AM    TRIG 107 01/16/2023 11:18 AM    HDL 46 01/16/2023 11:18 AM    HDL 44 11/22/2011 10:03 AM    LDLCALC 92 01/16/2023 11:18 AM     Lab Results   Component Value Date    ALT 13 01/16/2023    AST 16 01/16/2023     Lab Results   Component Value Date    TSH 0.03 (L) 01/16/2023    TSH 0.033 02/23/2022    T4FREE 0.80 02/23/2022    T4FREE 0.9 09/29/2017     Lab Results   Component Value Date    WBC 5.7 01/16/2023    HGB 14.1 01/16/2023    HCT 42.6 01/16/2023    MCV 90.7 01/16/2023     01/16/2023     No results found for: INR   No results found for: PSA   No results found for: [facility]     Patient Active Problem List

## 2023-07-16 DIAGNOSIS — K58.0 IRRITABLE BOWEL SYNDROME WITH DIARRHEA: ICD-10-CM

## 2023-07-17 RX ORDER — DICYCLOMINE HYDROCHLORIDE 10 MG/1
10 CAPSULE ORAL 2 TIMES DAILY PRN
Qty: 90 CAPSULE | Refills: 0 | OUTPATIENT
Start: 2023-07-17

## 2023-10-24 ENCOUNTER — TELEMEDICINE (OUTPATIENT)
Dept: INTERNAL MEDICINE CLINIC | Age: 48
End: 2023-10-24
Payer: COMMERCIAL

## 2023-10-24 DIAGNOSIS — F51.01 PRIMARY INSOMNIA: ICD-10-CM

## 2023-10-24 DIAGNOSIS — R79.89 LOW SERUM PROGESTERONE: ICD-10-CM

## 2023-10-24 DIAGNOSIS — K58.2 IRRITABLE BOWEL SYNDROME WITH BOTH CONSTIPATION AND DIARRHEA: ICD-10-CM

## 2023-10-24 DIAGNOSIS — E03.9 ACQUIRED HYPOTHYROIDISM: Primary | ICD-10-CM

## 2023-10-24 PROCEDURE — 99214 OFFICE O/P EST MOD 30 MIN: CPT | Performed by: INTERNAL MEDICINE

## 2023-10-24 RX ORDER — DICYCLOMINE HYDROCHLORIDE 10 MG/1
10 CAPSULE ORAL 2 TIMES DAILY PRN
Qty: 90 CAPSULE | Refills: 1 | Status: SHIPPED | OUTPATIENT
Start: 2023-10-24

## 2023-10-24 RX ORDER — LEVOTHYROXINE, LIOTHYRONINE 57; 13.5 UG/1; UG/1
180 TABLET ORAL DAILY
Qty: 180 TABLET | Refills: 1 | Status: SHIPPED | OUTPATIENT
Start: 2023-10-24

## 2023-10-24 NOTE — PROGRESS NOTES
daily           Review of Systems: 14 systems were negative except of what was stated on HPI    Nursing note and vitals reviewed. There were no vitals filed for this visit. Wt Readings from Last 3 Encounters:   06/19/23 103 kg (227 lb)   04/26/23 101.2 kg (223 lb)   01/16/23 101.2 kg (223 lb)     BP Readings from Last 3 Encounters:   06/19/23 120/78   04/26/23 100/64   01/16/23 104/70     There is no height or weight on file to calculate BMI. Constitutional: Patient appears well-developed and well-nourished. No distress. Head: Normocephalic and atraumatic. Psychiatric: Normal mood and affect.  Behavior is normal.

## 2024-01-22 DIAGNOSIS — K58.2 IRRITABLE BOWEL SYNDROME WITH BOTH CONSTIPATION AND DIARRHEA: ICD-10-CM

## 2024-01-22 RX ORDER — DICYCLOMINE HYDROCHLORIDE 10 MG/1
10 CAPSULE ORAL 2 TIMES DAILY PRN
Qty: 180 CAPSULE | OUTPATIENT
Start: 2024-01-22

## 2024-04-06 DIAGNOSIS — K58.2 IRRITABLE BOWEL SYNDROME WITH BOTH CONSTIPATION AND DIARRHEA: ICD-10-CM

## 2024-04-06 DIAGNOSIS — F51.01 PRIMARY INSOMNIA: ICD-10-CM

## 2024-04-08 RX ORDER — TRAZODONE HYDROCHLORIDE 150 MG/1
150 TABLET ORAL NIGHTLY
Qty: 90 TABLET | Refills: 3 | OUTPATIENT
Start: 2024-04-08

## 2024-04-08 RX ORDER — DICYCLOMINE HYDROCHLORIDE 10 MG/1
10 CAPSULE ORAL 2 TIMES DAILY PRN
Qty: 180 CAPSULE | OUTPATIENT
Start: 2024-04-08

## 2024-05-16 ENCOUNTER — TELEPHONE (OUTPATIENT)
Dept: BARIATRICS/WEIGHT MGMT | Age: 49
End: 2024-05-16

## 2024-05-16 NOTE — TELEPHONE ENCOUNTER
Left message for pt regarding September Digital seminar for 2023 [employer]. Office number provided to return call to Effie Matt